# Patient Record
Sex: FEMALE | Race: BLACK OR AFRICAN AMERICAN | NOT HISPANIC OR LATINO | Employment: FULL TIME | URBAN - METROPOLITAN AREA
[De-identification: names, ages, dates, MRNs, and addresses within clinical notes are randomized per-mention and may not be internally consistent; named-entity substitution may affect disease eponyms.]

---

## 2018-01-25 ENCOUNTER — OFFICE VISIT (OUTPATIENT)
Dept: OBSTETRICS AND GYNECOLOGY | Age: 48
End: 2018-01-25

## 2018-01-25 VITALS
BODY MASS INDEX: 45.26 KG/M2 | DIASTOLIC BLOOD PRESSURE: 74 MMHG | HEIGHT: 66 IN | WEIGHT: 281.6 LBS | SYSTOLIC BLOOD PRESSURE: 118 MMHG

## 2018-01-25 DIAGNOSIS — Z11.51 SCREENING FOR HPV (HUMAN PAPILLOMAVIRUS): ICD-10-CM

## 2018-01-25 DIAGNOSIS — Z01.419 WELL WOMAN EXAM WITH ROUTINE GYNECOLOGICAL EXAM: Primary | ICD-10-CM

## 2018-01-25 PROCEDURE — 99396 PREV VISIT EST AGE 40-64: CPT | Performed by: OBSTETRICS & GYNECOLOGY

## 2018-01-25 RX ORDER — CHLORZOXAZONE 500 MG/1
TABLET ORAL
Refills: 0 | COMMUNITY
Start: 2017-10-26 | End: 2019-01-11

## 2018-01-25 RX ORDER — ATORVASTATIN CALCIUM 40 MG/1
TABLET, FILM COATED ORAL
Refills: 3 | COMMUNITY
Start: 2017-10-26

## 2018-01-25 RX ORDER — ERGOCALCIFEROL 1.25 MG/1
CAPSULE ORAL
Refills: 0 | COMMUNITY
Start: 2017-10-26 | End: 2019-01-11

## 2018-01-25 RX ORDER — IBUPROFEN 800 MG/1
TABLET ORAL
Refills: 0 | COMMUNITY
Start: 2017-10-26 | End: 2019-01-11

## 2018-01-25 NOTE — PROGRESS NOTES
Chief complaint: annual    Subjective   History of Present Illness    Angelo Lay is a 47 y.o.  who presents for annual exam.  Had endometrial ablation . Amenorrhea for about 5 yrs then started having some spotting and cramping. Sometimes it is like a period, other times just spotting. C/o small lump on right labia. Recently dx w/ type 2 diabetes, but doesn't check glc at home. Has had diet counseling for diabetic diet but has not been following it. Hasn't been sexually active for 5 yrs. Declines std screening and contraception.    Soc Hx- works for [employer] in [employer] at [place]  Obstetric History:  OB History      Para Term  AB Living    2 1 1  1 1    SAB TAB Ectopic Multiple Live Births        1         Menstrual History:     No LMP recorded (lmp unknown).         Current contraception: abstinence  History of abnormal Pap smear: yes - yrs ago, fu normal  Received Gardasil immunization: no  Perform regular self breast exam: yes -    Family history of uterine or ovarian cancer: no  Family History of colon cancer: no  Family history of breast cancer: yes - breast cancer    Mammogram: done today.  Colonoscopy: not indicated.  DEXA: not indicated.    Exercise: moderately active  Calcium/Vitamin D: adequate intake    The following portions of the patient's history were reviewed and updated as appropriate: allergies, current medications, past family history, past medical history, past social history, past surgical history and problem list.    Review of Systems   Constitutional: Negative for activity change, fatigue, fever and unexpected weight change.   Respiratory: Negative for chest tightness and shortness of breath.    Cardiovascular: Negative for chest pain, palpitations and leg swelling.   Gastrointestinal: Negative for abdominal distention, abdominal pain, blood in stool, constipation, diarrhea, nausea and vomiting.   Endocrine: Negative for cold intolerance, heat intolerance,  "polydipsia, polyphagia and polyuria.   Genitourinary: Positive for menstrual problem.   Musculoskeletal: Positive for back pain. Negative for arthralgias.   Skin: Negative for color change.   Neurological: Negative for weakness and headaches.   Hematological: Does not bruise/bleed easily.   Psychiatric/Behavioral: Negative for confusion.       Pertinent items are noted in HPI.     Objective   Physical Exam    /74  Ht 167.6 cm (66\")  Wt 128 kg (281 lb 9.6 oz)  LMP  (LMP Unknown)  BMI 45.45 kg/m2    General:   alert, appears stated age and cooperative   Neck: no asymmetry, masses, or scars   Heart: regular rate and rhythm, S1, S2 normal, no murmur, click, rub or gallop   Lungs: clear to auscultation bilaterally   Abdomen: soft, non-tender, without masses or organomegaly, obese   Breast: inspection negative, no nipple discharge or bleeding, no masses or nodularity palpable   Vulva: normal, Bartholin's, Urethra, [place]'s normal   Vagina: normal mucosa   Cervix: no bleeding following Pap, no cervical motion tenderness, no lesions and nulliparous appearance   Uterus: non-tender, normal shape and consistency, exam limited by habitus   Adnexa: normal adnexa and no mass, fullness, tenderness   Rectal: not indicated     Assessment/Plan   Angelo was seen today for gynecologic exam.    Diagnoses and all orders for this visit:    Well woman exam with routine gynecological exam  -     PapIG, HPV, Rfx 16 / 18 - ThinPrep Vial, Cervix    Screening for HPV (human papillomavirus)  -     PapIG, HPV, Rfx 16 / 18 - ThinPrep Vial, Cervix      Plan gyn US and EMB for abnormal uterine bleeding  Recommend better glucose control and diet compliance, pt doesn't check glc and unaware of HGB-A1c levels  Breast self exam technique reviewed and patient encouraged to perform self-exam monthly.  Discussed healthy lifestyle modifications.  Pap smear sent                 "

## 2018-01-30 LAB
CYTOLOGIST CVX/VAG CYTO: NORMAL
CYTOLOGY CVX/VAG DOC THIN PREP: NORMAL
DX ICD CODE: NORMAL
HIV 1 & 2 AB SER-IMP: NORMAL
HPV I/H RISK 1 DNA CVX QL PROBE+SIG AMP: NEGATIVE
OTHER STN SPEC: NORMAL
PATH REPORT.FINAL DX SPEC: NORMAL
STAT OF ADQ CVX/VAG CYTO-IMP: NORMAL

## 2018-01-31 ENCOUNTER — TELEPHONE (OUTPATIENT)
Dept: OBSTETRICS AND GYNECOLOGY | Age: 48
End: 2018-01-31

## 2018-02-07 ENCOUNTER — OFFICE VISIT (OUTPATIENT)
Dept: OBSTETRICS AND GYNECOLOGY | Age: 48
End: 2018-02-07

## 2018-02-07 ENCOUNTER — PROCEDURE VISIT (OUTPATIENT)
Dept: OBSTETRICS AND GYNECOLOGY | Age: 48
End: 2018-02-07

## 2018-02-07 VITALS
SYSTOLIC BLOOD PRESSURE: 138 MMHG | DIASTOLIC BLOOD PRESSURE: 80 MMHG | HEIGHT: 66 IN | WEIGHT: 281 LBS | BODY MASS INDEX: 45.16 KG/M2

## 2018-02-07 DIAGNOSIS — N83.201 CYST OF RIGHT OVARY: ICD-10-CM

## 2018-02-07 DIAGNOSIS — E11.9 TYPE 2 DIABETES MELLITUS WITHOUT COMPLICATION, WITHOUT LONG-TERM CURRENT USE OF INSULIN (HCC): ICD-10-CM

## 2018-02-07 DIAGNOSIS — N93.9 ABNORMAL UTERINE BLEEDING (AUB): Primary | ICD-10-CM

## 2018-02-07 DIAGNOSIS — N92.6 IRREGULAR MENSES: Primary | ICD-10-CM

## 2018-02-07 PROCEDURE — 99213 OFFICE O/P EST LOW 20 MIN: CPT | Performed by: OBSTETRICS & GYNECOLOGY

## 2018-02-07 PROCEDURE — 76830 TRANSVAGINAL US NON-OB: CPT | Performed by: OBSTETRICS & GYNECOLOGY

## 2018-02-08 NOTE — PROGRESS NOTES
"Subjective     Chief Complaint   Patient presents with   • Follow-up       Angelo Lay is a 47 y.o.  whose LMP is No LMP recorded (lmp unknown). presents with abnormal uterine bleeding. H/o Novasure ablation  and amenorrhea for 5 yrs then irregular bleeding and spotting. Also has type 2 diabetes and obesity.      No Additional Complaints Reported    The following portions of the patient's history were reviewed and updated as appropriate:vital signs, allergies, current medications, past family history, past medical history, past social history, past surgical history and problem list      Review of Systems   Constitutional: negative for fever, chills, activity change, appetite change, fatigue and unexpected weight change.  Eyes: negative  Ears, nose, mouth, throat, and face: negative  Respiratory: negative  Cardiovascular: negative  Gastrointestinal: negative  Genitourinary:positive for abnormal menstrual periods  Musculoskeletal:negative  Neurological: negative  Behavioral/Psych: negative  Endocrine: negative     Objective      /80  Ht 167.6 cm (66\")  Wt 127 kg (281 lb)  LMP  (LMP Unknown)  BMI 45.35 kg/m2    Physical Exam    General:   alert, appears stated age and no distress   Heart: Not performed today   Lungs: Not performed today.   Breast: Not performed today   Neck: thyroid not enlarged, symmetric, no tenderness/mass/nodules   Abdomen: Soft, obese, NT   CVA: Not performed today   Pelvis: External genitalia: normal general appearance  Urinary system: urethral meatus normal  Vaginal: normal mucosa without prolapse or lesions  Cervix:  No lesions, scarring consistent w/ prior cryotherapy, os appears stenotic   Extremities: Extremities normal, atraumatic, no cyanosis or edema   Neurologic: negative   Psychiatric: Normal affect, judgement, and mood       Lab Review   Labs: pap 2018 normal, HPV-     Imaging   Ultrasound - Pelvic Vaginal   Uterus 8.4 x 5.5 x 5 cm, EML=4mm, normal ovaries, " uterus w/ multiple small calcified fibroids (largest 2 x 2.2cm), right ovarian follicle 2.2 x 2.7 cm,     Assessment/Plan     ASSESSMENT  1. Abnormal uterine bleeding (AUB)    2. Type 2 diabetes mellitus without complication, without long-term current use of insulin    3. Cyst of right ovary        PLAN  1.   Orders Placed This Encounter   Procedures   • Endometrial Biopsy   • TSH   • Follicle Stimulating Hormone   • Hemoglobin A1c       2. Medications prescribed this encounter:      No orders of the defined types were placed in this encounter.      3. Has f/u w/ new primary MD, recommend pt check glc levels for better control of diabetes    Follow up: 6 week(s) to recheck ovarian cyst, consider progesterone tx to protect the endometrial lining    Dionne Pandya MD  2/8/2018

## 2018-02-09 ENCOUNTER — TELEPHONE (OUTPATIENT)
Dept: OBSTETRICS AND GYNECOLOGY | Age: 48
End: 2018-02-09

## 2018-02-09 LAB
FSH SERPL-ACNC: 7.2 MIU/ML
HBA1C MFR BLD: 6.3 % (ref 4.8–5.6)
TSH SERPL DL<=0.005 MIU/L-ACNC: 1.93 MIU/ML (ref 0.27–4.2)

## 2018-02-09 NOTE — TELEPHONE ENCOUNTER
----- Message from Dionne Pandya MD sent at 2/9/2018  9:11 AM EST -----  Thyroid normal, FSH normal (not menopausal) Hemoglobin-A1C elevated at 6.3

## 2018-02-12 LAB
DX ICD CODE: NORMAL
DX ICD CODE: NORMAL
PATH REPORT.FINAL DX SPEC: NORMAL
PATH REPORT.GROSS SPEC: NORMAL
PATH REPORT.RELEVANT HX SPEC: NORMAL
PATH REPORT.SITE OF ORIGIN SPEC: NORMAL
PATHOLOGIST NAME: NORMAL
PAYMENT PROCEDURE: NORMAL

## 2018-10-15 ENCOUNTER — TELEPHONE (OUTPATIENT)
Dept: OBSTETRICS AND GYNECOLOGY | Age: 48
End: 2018-10-15

## 2018-10-15 NOTE — TELEPHONE ENCOUNTER
Pt would like to get back on bcp, does she have to be seen first or can you just call something in?    She knows you are out till Wednesday.  She said it is ok to wait till then.

## 2018-11-15 ENCOUNTER — CONSULT (OUTPATIENT)
Dept: OBSTETRICS AND GYNECOLOGY | Age: 48
End: 2018-11-15

## 2018-11-15 VITALS
SYSTOLIC BLOOD PRESSURE: 122 MMHG | BODY MASS INDEX: 45.16 KG/M2 | DIASTOLIC BLOOD PRESSURE: 78 MMHG | HEIGHT: 66 IN | WEIGHT: 281 LBS

## 2018-11-15 DIAGNOSIS — Z30.09 ENCOUNTER FOR COUNSELING REGARDING CONTRACEPTION: Primary | ICD-10-CM

## 2018-11-15 PROCEDURE — 99213 OFFICE O/P EST LOW 20 MIN: CPT | Performed by: OBSTETRICS & GYNECOLOGY

## 2018-11-15 RX ORDER — ACETAMINOPHEN AND CODEINE PHOSPHATE 120; 12 MG/5ML; MG/5ML
1 SOLUTION ORAL DAILY
Qty: 28 TABLET | Refills: 12 | Status: SHIPPED | OUTPATIENT
Start: 2018-11-15 | End: 2019-03-28 | Stop reason: SINTOL

## 2018-11-15 NOTE — PROGRESS NOTES
"Annette Lay is a 47 y.o. female  who presents for contraception counseling. The patient has no complaints today.  Pt newly sexually active again. Declines childbearing. She declines std screening today. H/o prior ablation, has some dark spotting from time to time.    Menstrual History:  OB History      Para Term  AB Living    2 1 1   1 1    SAB TAB Ectopic Molar Multiple Live Births              1         No LMP recorded (lmp unknown).       The following portions of the patient's history were reviewed and updated as appropriate: allergies, current medications, past family history, past medical history, past social history, past surgical history and problem list.    Review of Systems  Pertinent items are noted in HPI.     Objective      /78   Ht 167.6 cm (66\")   Wt 127 kg (281 lb)   LMP  (LMP Unknown)   BMI 45.35 kg/m²     General:   alert, appears stated age and cooperative   Heart:    Lungs:    Abdomen:    Vulva:    Vagina:    Cervix:    Uterus:    Adnexa:    Neuro- normal  Psych- normal affect, appropriate, oriented x3    Lab Review  2018 pap normal, HPV-   HGB-A1C 6.8%  Assessment     47 y.o. needs contraception  Plan  Discussed options for contraception including Nexplanon, progesterone only pills, and depo provera. Not a candidate for IUD due to prior endometrial ablation. Not a good candidate for elective surgery due to morbid obesity (BMI=45) and DM. Discussed risks/benefits of all options. Estrogen not recommended at her age with her medical problems. Pt desires to try Progesterone only pill. Recommend back up for 1 mo. Info given for Nexplanon. Discussed better efficacy of nexplanon but pt declines at this time.   All questions answered.   "

## 2019-01-07 ENCOUNTER — TELEPHONE (OUTPATIENT)
Dept: OBSTETRICS AND GYNECOLOGY | Age: 49
End: 2019-01-07

## 2019-01-11 ENCOUNTER — PROCEDURE VISIT (OUTPATIENT)
Dept: OBSTETRICS AND GYNECOLOGY | Age: 49
End: 2019-01-11

## 2019-01-11 ENCOUNTER — OFFICE VISIT (OUTPATIENT)
Dept: OBSTETRICS AND GYNECOLOGY | Age: 49
End: 2019-01-11

## 2019-01-11 VITALS
HEIGHT: 66 IN | DIASTOLIC BLOOD PRESSURE: 80 MMHG | SYSTOLIC BLOOD PRESSURE: 120 MMHG | BODY MASS INDEX: 44.84 KG/M2 | WEIGHT: 279 LBS

## 2019-01-11 DIAGNOSIS — N92.1 MENORRHAGIA WITH IRREGULAR CYCLE: Primary | ICD-10-CM

## 2019-01-11 DIAGNOSIS — N92.0 MENORRHAGIA WITH REGULAR CYCLE: Primary | ICD-10-CM

## 2019-01-11 LAB
B-HCG UR QL: NEGATIVE
INTERNAL NEGATIVE CONTROL: NEGATIVE
INTERNAL POSITIVE CONTROL: POSITIVE
Lab: NORMAL

## 2019-01-11 PROCEDURE — 81025 URINE PREGNANCY TEST: CPT | Performed by: NURSE PRACTITIONER

## 2019-01-11 PROCEDURE — 58100 BIOPSY OF UTERUS LINING: CPT | Performed by: NURSE PRACTITIONER

## 2019-01-11 PROCEDURE — 99214 OFFICE O/P EST MOD 30 MIN: CPT | Performed by: NURSE PRACTITIONER

## 2019-01-11 PROCEDURE — 76830 TRANSVAGINAL US NON-OB: CPT | Performed by: NURSE PRACTITIONER

## 2019-01-11 NOTE — PROGRESS NOTES
"Annette Lay is a 48 y.o. female is being seen today for   Chief Complaint   Patient presents with   • Menorrhagia   .    History of Present Illness     Patient here today for evaluation and ultrasound for heavy periods x 3 months.  She saw Dr Pandya in October and needed contraception at that time.  They discussed numerous options and patient decided to start on progesterone only pills.    She has a history of endometrial ablation for menorrhagia and periods were well controlled until 3 months ago  Since that time her periods have been regular and have lasted a full 7 days each month  She denies any bleeding between cycles or post IC  She hasn't missed any pills and has no new health changes or concerns  TSH was normal in January and Ha1c was 6.8%    The following portions of the patient's history were reviewed and updated as appropriate: allergies, current medications, past family history, past medical history, past social history, past surgical history and problem list.    /80   Ht 167.6 cm (66\")   Wt 127 kg (279 lb)   LMP 01/01/2019   BMI 45.03 kg/m²         Review of Systems   Constitutional: Negative.    HENT: Negative.    Eyes: Negative.    Respiratory: Negative.    Cardiovascular: Negative.    Gastrointestinal: Negative.    Endocrine: Negative.    Genitourinary: Negative.    Musculoskeletal: Negative.    Skin: Negative.    Allergic/Immunologic: Negative.    Neurological: Negative.    Hematological: Negative.    Psychiatric/Behavioral: Negative.        Objective   Physical Exam   Constitutional: She is oriented to person, place, and time. She appears well-developed and well-nourished.   Genitourinary: Vagina normal and uterus normal. Uterus is not tender. Cervix exhibits no motion tenderness, no discharge and no friability.   Neurological: She is alert and oriented to person, place, and time.   Skin: Skin is warm and dry.   Psychiatric: She has a normal mood and affect. "     Endometrial Biopsy    Date of procedure:  1/11/2019    Procedure documentation:    The cervix was grasped anterior at the 12 o'clock position.  The cavity sounded to 7 centimeters.  An endometrial biopsy specimen was obtained and multiple passes.  The tissue was sent for permanent histopathologic evaluation.  Tenaculum was removed from the cervix with scant bleeding.    Post procedure instructions: She was instructed to call us in 1 week's time if she has not heard from us otherwise.  If there is any significant fever or excessive bleeding or pain she is to call immediately. Patient tolerated well.        Assessment/Plan   Angelo was seen today for menorrhagia.    Diagnoses and all orders for this visit:    Menorrhagia with regular cycle  -     Reference Histopathology      Ultrasound done, endometrial lining 7.27mm, small fibroid noted and calcifications seen throughout myometrium.  Ovaries WNL.  Discussed endometrial biopsy with patient to rule out endometrial cancer given her recent change in bleeding patterns.  That was done and sent today.  We discussed depo provera or low dose birth control pills (Loloestrin Fe) to help regulate her bleeding patterns.  R/B/A discussed and patient prefers to stay on progesterone only pills for now and will continue to observe and call with any changes.

## 2019-01-16 LAB
DX ICD CODE: NORMAL
PATH REPORT.FINAL DX SPEC: NORMAL
PATH REPORT.GROSS SPEC: NORMAL
PATH REPORT.SITE OF ORIGIN SPEC: NORMAL
PATHOLOGIST NAME: NORMAL
PAYMENT PROCEDURE: NORMAL

## 2019-02-08 ENCOUNTER — PROCEDURE VISIT (OUTPATIENT)
Dept: OBSTETRICS AND GYNECOLOGY | Age: 49
End: 2019-02-08

## 2019-02-08 ENCOUNTER — APPOINTMENT (OUTPATIENT)
Dept: WOMENS IMAGING | Facility: HOSPITAL | Age: 49
End: 2019-02-08

## 2019-02-08 DIAGNOSIS — Z12.31 VISIT FOR SCREENING MAMMOGRAM: Primary | ICD-10-CM

## 2019-02-08 PROCEDURE — 77067 SCR MAMMO BI INCL CAD: CPT | Performed by: RADIOLOGY

## 2019-03-13 ENCOUNTER — TELEPHONE (OUTPATIENT)
Dept: OBSTETRICS AND GYNECOLOGY | Age: 49
End: 2019-03-13

## 2019-03-19 ENCOUNTER — OFFICE VISIT (OUTPATIENT)
Dept: OBSTETRICS AND GYNECOLOGY | Age: 49
End: 2019-03-19

## 2019-03-19 VITALS
WEIGHT: 270 LBS | HEIGHT: 66 IN | SYSTOLIC BLOOD PRESSURE: 120 MMHG | BODY MASS INDEX: 43.39 KG/M2 | DIASTOLIC BLOOD PRESSURE: 70 MMHG

## 2019-03-19 DIAGNOSIS — Z01.419 ENCOUNTER FOR GYNECOLOGICAL EXAMINATION: Primary | ICD-10-CM

## 2019-03-19 PROCEDURE — 99396 PREV VISIT EST AGE 40-64: CPT | Performed by: NURSE PRACTITIONER

## 2019-03-19 NOTE — PROGRESS NOTES
Subjective       History of Present Illness    Chief Complaint   Patient presents with   • Gynecologic Exam     Last pap 18 neg/HPV neg.   • Menstrual Problem     Pt c/o unusually long periods. Last period started 19 and ended 3/11/19.        Angelo Lay is a 48 y.o. female who presents for annual exam. She is taking progesterone only pills and is still having long, heavy cycles.  Most months they are less than 7 days but occasionally are longer and are always heavy. She was seen in January for the same issue. She had an ultrasound that showed an endometrial lining 7.27mm and a small fibroid noted as well as calcifications seen throughout myometrium. Her endometrial biopsy at that time that was normal.  She was given the option of depo, POP or low dose combined OCPs and she wanted to try the progesterone only.  She has not seen any improvement during this time.  She is interested in surgical options.  She has already had an endometrial ablation.    OB History    Para Term  AB Living   2 1 1   1 1   SAB TAB Ectopic Molar Multiple Live Births             1      # Outcome Date GA Lbr Unruly/2nd Weight Sex Delivery Anes PTL Lv   2 AB               Birth Comments: VIP   1 Term     F Vag-Spont   AMITA          The following portions of the patient's history were reviewed and updated as appropriate: allergies, current medications, past family history, past medical history, past social history, past surgical history and problem list.    Her menses are regular every 28-30 days, lasting 4-7 days. Last few months they have lasted longer.    Current contraception: oral progesterone-only contraceptive  History of abnormal Pap smear: yes - years ago  Received Gardasil immunization: no  Perform regular self breast exam: yes - occasional  Family history of uterine or ovarian cancer: no  Family History of colon cancer: no  Family history of breast cancer: yes - maternal aunt (unsure of age)    Mammogram: up to  "date.  Colonoscopy: not indicated.  DEXA: not indicated.  Last Pap:2018 neg/neg    Social History    Tobacco Use      Smoking status: Never Smoker      Smokeless tobacco: Never Used    Exercise: moderately active  Calcium/Vitamin D: uses supplements    The following portions of the patient's history were reviewed and updated as appropriate: allergies, current medications, past family history, past medical history, past social history, past surgical history and problem list.    Review of Systems   Constitutional: Negative.    HENT: Negative.    Eyes: Negative.    Respiratory: Negative.    Cardiovascular: Negative.    Gastrointestinal: Negative.    Endocrine: Negative.    Genitourinary: Negative.    Musculoskeletal: Negative.    Skin: Negative.    Allergic/Immunologic: Negative.    Neurological: Negative.    Hematological: Negative.    Psychiatric/Behavioral: Negative.          Objective   Physical Exam   Constitutional: She is oriented to person, place, and time. She appears well-developed and well-nourished.   Neck: No thyroid mass present.   Cardiovascular: Normal rate, regular rhythm and normal heart sounds.   Pulmonary/Chest: Effort normal and breath sounds normal. Right breast exhibits no mass, no nipple discharge, no skin change and no tenderness. Left breast exhibits no mass, no nipple discharge, no skin change and no tenderness.   Abdominal: Soft. There is no tenderness.   Genitourinary: Vagina normal and uterus normal. There is no rash or lesion on the right labia. There is no rash or lesion on the left labia. Cervix exhibits no motion tenderness, no discharge and no friability. Right adnexum displays no mass and no tenderness. Left adnexum displays no mass and no tenderness.   Neurological: She is alert and oriented to person, place, and time.   Psychiatric: She has a normal mood and affect. Her behavior is normal.   Vitals reviewed.      /70   Ht 167.6 cm (66\")   Wt 122 kg (270 lb)   LMP " 02/26/2019   BMI 43.58 kg/m²     Assessment/Plan   Angelo was seen today for gynecologic exam and menstrual problem.    Diagnoses and all orders for this visit:    Encounter for gynecological examination  -     IGP, Aptima HPV, Rfx 16 / 18,45        Breast self exam technique reviewed and patient encouraged to perform self-exam monthly.  Discussed healthy lifestyle modifications.  Pap smear sent per patient request, we discussed the current guidelines  Recommended 30 minutes of aerobic exercise five times per week.  Discussed calcium needs to prevent osteoporosis  Patient would like to schedule an appt with her MD to discuss surgical options for menorrhagia.  We did discuss other non surgical options but she is not interested at this time.

## 2019-03-22 ENCOUNTER — TELEPHONE (OUTPATIENT)
Dept: OBSTETRICS AND GYNECOLOGY | Age: 49
End: 2019-03-22

## 2019-03-22 LAB
CYTOLOGIST CVX/VAG CYTO: NORMAL
CYTOLOGY CVX/VAG DOC THIN PREP: NORMAL
DX ICD CODE: NORMAL
HIV 1 & 2 AB SER-IMP: NORMAL
HPV I/H RISK 4 DNA CVX QL PROBE+SIG AMP: NEGATIVE
OTHER STN SPEC: NORMAL
PATH REPORT.FINAL DX SPEC: NORMAL
STAT OF ADQ CVX/VAG CYTO-IMP: NORMAL

## 2019-03-22 RX ORDER — METRONIDAZOLE 500 MG/1
500 TABLET ORAL 2 TIMES DAILY
Qty: 14 TABLET | Refills: 0 | Status: SHIPPED | OUTPATIENT
Start: 2019-03-22 | End: 2019-03-29

## 2019-03-22 NOTE — TELEPHONE ENCOUNTER
----- Message from ASHLEY Dejesus sent at 3/22/2019 10:34 AM EDT -----  Notify pap and hpv are negative.  It did show possible bacterial vaginosis.  I sent in flagyl for her to take for that.  Avoid alcohol while taking flagyl.

## 2019-03-27 ENCOUNTER — TELEPHONE (OUTPATIENT)
Dept: OBSTETRICS AND GYNECOLOGY | Age: 49
End: 2019-03-27

## 2019-03-28 DIAGNOSIS — N92.6 IRREGULAR MENSES: ICD-10-CM

## 2019-03-28 DIAGNOSIS — B37.31 YEAST VAGINITIS: Primary | ICD-10-CM

## 2019-03-28 RX ORDER — NORETHINDRONE ACETATE AND ETHINYL ESTRADIOL AND FERROUS FUMARATE 1MG-20(24)
1 KIT ORAL DAILY
Qty: 28 TABLET | Refills: 12 | Status: SHIPPED | OUTPATIENT
Start: 2019-03-28 | End: 2020-03-27

## 2019-03-28 RX ORDER — FLUCONAZOLE 150 MG/1
TABLET ORAL
Qty: 2 TABLET | Refills: 0 | Status: SHIPPED | OUTPATIENT
Start: 2019-03-28 | End: 2020-07-08

## 2020-04-01 ENCOUNTER — TELEPHONE (OUTPATIENT)
Dept: OBSTETRICS AND GYNECOLOGY | Age: 50
End: 2020-04-01

## 2020-04-01 DIAGNOSIS — N92.6 IRREGULAR MENSES: ICD-10-CM

## 2020-04-01 RX ORDER — NORETHINDRONE ACETATE AND ETHINYL ESTRADIOL AND FERROUS FUMARATE 1MG-20(24)
1 KIT ORAL DAILY
Qty: 28 TABLET | Refills: 3 | Status: SHIPPED | OUTPATIENT
Start: 2020-04-01 | End: 2020-07-08

## 2020-04-01 RX ORDER — NORETHINDRONE ACETATE AND ETHINYL ESTRADIOL AND FERROUS FUMARATE 1MG-20(24)
KIT ORAL
Qty: 28 TABLET | Refills: 12 | OUTPATIENT
Start: 2020-04-01

## 2020-04-01 NOTE — TELEPHONE ENCOUNTER
Patient was denied her birth control refill due to being overdue for her AC.She made her appt.for 7/8/20 with .Requesting a refill until her appt.in July. Pharmacy on file.

## 2020-07-08 ENCOUNTER — OFFICE VISIT (OUTPATIENT)
Dept: OBSTETRICS AND GYNECOLOGY | Age: 50
End: 2020-07-08

## 2020-07-08 VITALS
DIASTOLIC BLOOD PRESSURE: 84 MMHG | WEIGHT: 280 LBS | BODY MASS INDEX: 45 KG/M2 | SYSTOLIC BLOOD PRESSURE: 132 MMHG | HEIGHT: 66 IN

## 2020-07-08 DIAGNOSIS — Z01.419 WELL WOMAN EXAM WITH ROUTINE GYNECOLOGICAL EXAM: Primary | ICD-10-CM

## 2020-07-08 DIAGNOSIS — Z87.42 H/O MENORRHAGIA: ICD-10-CM

## 2020-07-08 DIAGNOSIS — Z11.51 ENCOUNTER FOR SCREENING FOR HUMAN PAPILLOMAVIRUS (HPV): ICD-10-CM

## 2020-07-08 DIAGNOSIS — Z30.41 ENCOUNTER FOR SURVEILLANCE OF CONTRACEPTIVE PILLS: ICD-10-CM

## 2020-07-08 PROCEDURE — 99396 PREV VISIT EST AGE 40-64: CPT | Performed by: OBSTETRICS & GYNECOLOGY

## 2020-07-08 RX ORDER — ACETAMINOPHEN AND CODEINE PHOSPHATE 120; 12 MG/5ML; MG/5ML
1 SOLUTION ORAL DAILY
Qty: 28 TABLET | Refills: 12 | Status: SHIPPED | OUTPATIENT
Start: 2020-07-08 | End: 2021-07-22

## 2020-07-08 NOTE — PROGRESS NOTES
"Chief complaint:annual    Subjective   History of Present Illness    Angelo Lay is a 49 y.o.  who presents for annual exam. No gyn c/o. Has been happy with ocps for contraception and h/o menorrhagia/AUB. H/o endometrial ablation years ago then had some AUB. EMB benign 2018. Has been on Loestrin 24 as she had irregular bleeding with micronor.  Her menses are very light, sometimes absent.   2018 pap normal, HPV-  2019 MMG normal    Obstetric History:  OB History        2    Para   1    Term   1            AB   1    Living   1       SAB        TAB        Ectopic        Molar        Multiple        Live Births   1               Menstrual History:     No LMP recorded (lmp unknown).         Current contraception: OCP (estrogen/progesterone)  History of abnormal Pap smear: yes, years ago  Received Gardasil immunization: no  Perform regular self breast exam: yes -    Family history of uterine or ovarian cancer: no  Family History of colon cancer: no  Family history of breast cancer: yes - aunt with breast cancer    Mammogram: ordered.  Colonoscopy: not indicated.  DEXA: not indicated.    Exercise: moderately active  Calcium/Vitamin D: adequate intake    The following portions of the patient's history were reviewed and updated as appropriate: allergies, current medications, past family history, past medical history, past social history, past surgical history and problem list.    Review of Systems   Constitutional: Negative.    Respiratory: Negative.    Cardiovascular: Negative.    Gastrointestinal: Negative.    Genitourinary: Negative.    Musculoskeletal: Negative.    Psychiatric/Behavioral: Negative.        Pertinent items are noted in HPI.     Objective   Physical Exam    /84   Ht 167.6 cm (66\")   Wt 127 kg (280 lb)   LMP  (LMP Unknown)   Breastfeeding No   BMI 45.19 kg/m²     General:   alert, appears stated age and cooperative   Neck: no asymmetry, masses, or scars   Heart: " regular rate and rhythm, S1, S2 normal, no murmur, click, rub or gallop   Lungs: clear to auscultation bilaterally   Abdomen: soft, non-tender, without masses or organomegaly   Breast: inspection negative, no nipple discharge or bleeding, no masses or nodularity palpable   Vulva: Bartholin's, Urethra, [place]'s normal   Vagina: normal mucosa   Cervix: No lesions, appears stenotic   Uterus: normal size, mobile, non-tender, normal shape and consistency   Adnexa: normal adnexa and no mass, fullness, tenderness   Rectal: not indicated     Assessment/Plan   Angelo was seen today for gynecologic exam.    Diagnoses and all orders for this visit:    Well woman exam with routine gynecological exam  -     PapIG, HPV, Rfx 16 / 18    H/O menorrhagia    Encounter for surveillance of contraceptive pills  -     norethindrone (MICRONOR) 0.35 MG tablet; Take 1 tablet by mouth Daily.    Encounter for screening for human papillomavirus (HPV)  -     PapIG, HPV, Rfx 16 / 18    as pt is 49 and BP is borderline elevated, I would recommend she come off combination ocps, pt agrees to try micronor again.    Breast self exam technique reviewed and patient encouraged to perform self-exam monthly.  Discussed healthy lifestyle modifications.  Pap smear sent    Needs to schedule mammogram

## 2020-07-14 LAB
CYTOLOGIST CVX/VAG CYTO: NORMAL
CYTOLOGY CVX/VAG DOC CYTO: NORMAL
CYTOLOGY CVX/VAG DOC THIN PREP: NORMAL
DX ICD CODE: NORMAL
HIV 1 & 2 AB SER-IMP: NORMAL
HPV I/H RISK 1 DNA CVX QL PROBE+SIG AMP: NEGATIVE
OTHER STN SPEC: NORMAL
STAT OF ADQ CVX/VAG CYTO-IMP: NORMAL

## 2020-07-15 ENCOUNTER — TELEPHONE (OUTPATIENT)
Dept: OBSTETRICS AND GYNECOLOGY | Age: 50
End: 2020-07-15

## 2020-07-17 ENCOUNTER — PROCEDURE VISIT (OUTPATIENT)
Dept: OBSTETRICS AND GYNECOLOGY | Age: 50
End: 2020-07-17

## 2020-07-17 ENCOUNTER — APPOINTMENT (OUTPATIENT)
Dept: WOMENS IMAGING | Facility: HOSPITAL | Age: 50
End: 2020-07-17

## 2020-07-17 DIAGNOSIS — Z12.31 VISIT FOR SCREENING MAMMOGRAM: Primary | ICD-10-CM

## 2020-07-17 PROCEDURE — 77067 SCR MAMMO BI INCL CAD: CPT | Performed by: OBSTETRICS & GYNECOLOGY

## 2020-07-17 PROCEDURE — 77067 SCR MAMMO BI INCL CAD: CPT | Performed by: RADIOLOGY

## 2021-07-22 DIAGNOSIS — Z30.41 ENCOUNTER FOR SURVEILLANCE OF CONTRACEPTIVE PILLS: ICD-10-CM

## 2021-07-22 RX ORDER — ACETAMINOPHEN AND CODEINE PHOSPHATE 120; 12 MG/5ML; MG/5ML
1 SOLUTION ORAL DAILY
Qty: 28 TABLET | Refills: 12 | Status: SHIPPED | OUTPATIENT
Start: 2021-07-22 | End: 2021-07-28 | Stop reason: SDUPTHER

## 2021-07-28 ENCOUNTER — OFFICE VISIT (OUTPATIENT)
Dept: OBSTETRICS AND GYNECOLOGY | Age: 51
End: 2021-07-28

## 2021-07-28 ENCOUNTER — PROCEDURE VISIT (OUTPATIENT)
Dept: OBSTETRICS AND GYNECOLOGY | Age: 51
End: 2021-07-28

## 2021-07-28 ENCOUNTER — APPOINTMENT (OUTPATIENT)
Dept: WOMENS IMAGING | Facility: HOSPITAL | Age: 51
End: 2021-07-28

## 2021-07-28 VITALS
BODY MASS INDEX: 45.67 KG/M2 | WEIGHT: 284.2 LBS | SYSTOLIC BLOOD PRESSURE: 140 MMHG | DIASTOLIC BLOOD PRESSURE: 76 MMHG | HEIGHT: 66 IN

## 2021-07-28 DIAGNOSIS — Z30.41 ENCOUNTER FOR SURVEILLANCE OF CONTRACEPTIVE PILLS: ICD-10-CM

## 2021-07-28 DIAGNOSIS — Z12.31 VISIT FOR SCREENING MAMMOGRAM: Primary | ICD-10-CM

## 2021-07-28 DIAGNOSIS — Z01.419 WELL WOMAN EXAM WITH ROUTINE GYNECOLOGICAL EXAM: Primary | ICD-10-CM

## 2021-07-28 DIAGNOSIS — Z12.11 SCREENING FOR COLON CANCER: ICD-10-CM

## 2021-07-28 PROBLEM — E11.9 TYPE 2 DIABETES MELLITUS (HCC): Status: ACTIVE | Noted: 2021-07-28

## 2021-07-28 PROCEDURE — 77063 BREAST TOMOSYNTHESIS BI: CPT | Performed by: OBSTETRICS & GYNECOLOGY

## 2021-07-28 PROCEDURE — 77063 BREAST TOMOSYNTHESIS BI: CPT | Performed by: RADIOLOGY

## 2021-07-28 PROCEDURE — 77067 SCR MAMMO BI INCL CAD: CPT | Performed by: OBSTETRICS & GYNECOLOGY

## 2021-07-28 PROCEDURE — 99396 PREV VISIT EST AGE 40-64: CPT | Performed by: OBSTETRICS & GYNECOLOGY

## 2021-07-28 PROCEDURE — 77067 SCR MAMMO BI INCL CAD: CPT | Performed by: RADIOLOGY

## 2021-07-28 RX ORDER — ACETAMINOPHEN AND CODEINE PHOSPHATE 120; 12 MG/5ML; MG/5ML
1 SOLUTION ORAL DAILY
Qty: 28 TABLET | Refills: 12 | Status: SHIPPED | OUTPATIENT
Start: 2021-07-28 | End: 2022-07-21 | Stop reason: SDUPTHER

## 2021-07-28 NOTE — PROGRESS NOTES
"Chief complaint: annual    Subjective   History of Present Illness    Angelo Lay is a 50 y.o.  who presents for annual exam. Prior endometrial ablation years ago (done elsewhere) but pt reports not having sterilization procedure. Later developed AUB. EMB benign 2018. Has been tx w/ progesterone only pills for cycle control and contraception.  Her menses are q mo, light flow, has occasional intermenstrual spotting but she desires to continue with this option. 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         Current contraception: oral progesterone-only contraceptive  History of abnormal Pap smear: yes - h/o cryo years ago  Received Gardasil immunization: no  Perform regular self breast exam: yes -    Family history of uterine or ovarian cancer: no  Family History of colon cancer: no  Family history of breast cancer: yes - maternal aunt dx 71    Mammogram: done today.  Colonoscopy: recommended.  DEXA: not indicated.    Exercise: moderately active  Calcium/Vitamin D: adequate intake    The following portions of the patient's history were reviewed and updated as appropriate: allergies, current medications, past family history, past medical history, past social history, past surgical history and problem list.    Review of Systems   Constitutional: Negative.    Genitourinary:        Irregular spotting w/ micronor       Pertinent items are noted in HPI.     Objective   Physical Exam    /76   Ht 167.6 cm (66\")   Wt 129 kg (284 lb 3.2 oz)   LMP 2021 (Exact Date)   Breastfeeding No   " BMI 45.87 kg/m²     General:   alert, appears stated age and cooperative   Neck: no asymmetry, masses, or scars   Heart: regular rate and rhythm, S1, S2 normal, no murmur, click, rub or gallop   Lungs: clear to auscultation bilaterally   Abdomen: soft, non-tender, without masses or organomegaly, obese   Breast: inspection negative, no nipple discharge or bleeding, no masses or nodularity palpable   Vulva: normal, Bartholin's, Urethra, [place]'s normal   Vagina: normal mucosa   Cervix: no cervical motion tenderness, no lesions and stenotic os, scarring consistent w/ prior cyrotherapy   Uterus: normal size, mobile, non-tender, normal shape and consistency   Adnexa: normal adnexa and no mass, fullness, tenderness   Rectal: not indicated     Assessment/Plan   Diagnoses and all orders for this visit:    1. Well woman exam with routine gynecological exam (Primary)    2. Encounter for surveillance of contraceptive pills  -     norethindrone (MICRONOR) 0.35 MG tablet; Take 1 tablet by mouth Daily.  Dispense: 28 tablet; Refill: 12    3. Screening for colon cancer  -     Ambulatory Referral For Screening Colonoscopy    continue progesterone only pills for contraception, h/o menorrhagia and to protect the endometrium    Breast self exam technique reviewed and patient encouraged to perform self-exam monthly.  Discussed healthy lifestyle modifications.  Pap smear up to date    Pt notified that I am leaving the practice, offered to schedule f/u annual w/ lucretia PENALOZA, Dr Colmenares, pt upset and doesn't want to schedule f/u yet, she will consider options

## 2021-09-29 ENCOUNTER — TELEPHONE (OUTPATIENT)
Dept: OBSTETRICS AND GYNECOLOGY | Age: 51
End: 2021-09-29

## 2021-09-29 NOTE — TELEPHONE ENCOUNTER
Pt also calls asking for her mammogram and pap results from 07/2021. Mammogram report is in chart, I do not see her pap in labs. Please advise

## 2021-09-29 NOTE — TELEPHONE ENCOUNTER
Pt calls with c/o discharge and slight odor. Pt asking for diflucan to be called in. Pt states Dr Pandya had sent in a script for diflucan with refills to fill as needed for yeast infections and pt informed the script has . Please advise pharmacy verified

## 2021-09-30 RX ORDER — FLUCONAZOLE 150 MG/1
150 TABLET ORAL ONCE
Qty: 1 TABLET | Refills: 2 | Status: SHIPPED | OUTPATIENT
Start: 2021-09-30 | End: 2021-09-30

## 2021-09-30 NOTE — TELEPHONE ENCOUNTER
Spoke w/pt and notified her of mammogram results and pt verbalized understanding of need to follow up w/screening mammogram in one year.  I offered to mail pt a copy of her results and she declined.  Pt would like rx for yeast infection sent to pharmacy.  LOV 07/28/2021.  Pharmacy verified.

## 2021-09-30 NOTE — TELEPHONE ENCOUNTER
Pt notified, pap was not preformed on 07/28 apt with Dr. Pandya, pt would like to know MG results has not received letter.  Pt is c/o yeast infection, irritation, has taken antibiotic prior and is finished with medication.

## 2021-10-28 ENCOUNTER — OFFICE VISIT (OUTPATIENT)
Dept: OBSTETRICS AND GYNECOLOGY | Age: 51
End: 2021-10-28

## 2021-10-28 VITALS
BODY MASS INDEX: 43.3 KG/M2 | SYSTOLIC BLOOD PRESSURE: 132 MMHG | HEIGHT: 66 IN | DIASTOLIC BLOOD PRESSURE: 84 MMHG | WEIGHT: 269.4 LBS

## 2021-10-28 DIAGNOSIS — E11.69 TYPE 2 DIABETES MELLITUS WITH OTHER SPECIFIED COMPLICATION, UNSPECIFIED WHETHER LONG TERM INSULIN USE (HCC): ICD-10-CM

## 2021-10-28 DIAGNOSIS — N89.8 VAGINAL DISCHARGE: Primary | ICD-10-CM

## 2021-10-28 DIAGNOSIS — L73.9 FOLLICULITIS: ICD-10-CM

## 2021-10-28 PROCEDURE — 99214 OFFICE O/P EST MOD 30 MIN: CPT | Performed by: STUDENT IN AN ORGANIZED HEALTH CARE EDUCATION/TRAINING PROGRAM

## 2021-10-28 RX ORDER — SULFAMETHOXAZOLE AND TRIMETHOPRIM 800; 160 MG/1; MG/1
2 TABLET ORAL 2 TIMES DAILY
Qty: 28 TABLET | Refills: 0 | Status: SHIPPED | OUTPATIENT
Start: 2021-10-28 | End: 2021-11-04

## 2021-10-28 NOTE — PROGRESS NOTES
[facility]   Obstetrics and Gynecology     10/28/2021      Patient:  Angelo Lay   MR#:[id number]    Office note    Chief Complaint   Patient presents with   • Follow-up     Gyn follow up c/o outer vaginal irritation       Subjective     History of Present Illness  50 y.o. female  presents with painful bumps on groin and vaginal itching.  Reports bumps look like boils and presented a few weeks ago.  She had similar boils in her axillas in the past.  She thinks one of them may have drained recently.  She also reports vaginal itching, mostly on outside.  She took over-the-counter yeast infection cream and then one Diflucan, which improved symptoms temporarily.    She uses [employer], last about 2 months ago, and shaves intermittently but not recently.  Reports regular monthly menses and denies any menopausal symptoms.    History of diabetes, well controlled with Metformin.      Patient Active Problem List   Diagnosis   • Type 2 diabetes mellitus (HCC)       Past Medical History:   Diagnosis Date   • Abnormal Pap smear of cervix     tx w/ cryo in 20s, f/u wnl   • Diabetes mellitus (HCC)    • Hyperlipidemia      Past Surgical History:   Procedure Laterality Date   • ENDOMETRIAL ABLATION       Obstetric History:  OB History        2    Para   1    Term   1            AB   1    Living   1       SAB        IAB        Ectopic        Molar        Multiple        Live Births   1               Menstrual History:     Patient's last menstrual period was 2021 (approximate).       # 1 - Date: None, Sex: Female, Weight: None, GA: None, Delivery: Vaginal, Spontaneous, Apgar1: None, Apgar5: None, Living: Living, Birth Comments: None    # 2 - Date: None, Sex: None, Weight: None, GA: None, Delivery: None, Apgar1: None, Apgar5: None, Living: None, Birth Comments: VIP    Family History   Problem Relation Age of Onset   • Breast cancer Maternal Aunt    • Heart disease Father    • No Known Problems  "Mother    • Cancer Paternal Grandmother    • Heart disease Maternal Grandmother    • Heart disease Maternal Grandfather    • Ovarian cancer Neg Hx    • Uterine cancer Neg Hx    • Colon cancer Neg Hx    • Deep vein thrombosis Neg Hx    • Pulmonary embolism Neg Hx      Social History     Tobacco Use   • Smoking status: Never Smoker   • Smokeless tobacco: Never Used   Vaping Use   • Vaping Use: Never used   Substance Use Topics   • Alcohol use: Yes     Comment: Occasional   • Drug use: No     Patient has no known allergies.    Current Outpatient Medications:   •  atorvastatin (LIPITOR) 40 MG tablet, TK 1 T PO HS, Disp: , Rfl: 3  •  metFORMIN (GLUCOPHAGE) 1000 MG tablet, Take 1,000 mg by mouth 2 (Two) Times a Day With Meals., Disp: , Rfl:   •  norethindrone (MICRONOR) 0.35 MG tablet, Take 1 tablet by mouth Daily., Disp: 28 tablet, Rfl: 12  •  sulfamethoxazole-trimethoprim (Bactrim DS) 800-160 MG per tablet, Take 2 tablets by mouth 2 (Two) Times a Day for 7 days., Disp: 28 tablet, Rfl: 0    The following portions of the patient's history were reviewed and updated as appropriate: allergies, current medications, past family history, past medical history, past social history, past surgical history and problem list.    Review of Systems   Genitourinary: Positive for genital sores and vaginal discharge.   All other systems reviewed and are negative.      BP Readings from Last 3 Encounters:   10/28/21 132/84   07/28/21 140/76   07/08/20 132/84      Wt Readings from Last 3 Encounters:   10/28/21 122 kg (269 lb 6.4 oz)   07/28/21 129 kg (284 lb 3.2 oz)   07/08/20 127 kg (280 lb)      BMI: Estimated body mass index is 43.48 kg/m² as calculated from the following:    Height as of this encounter: 167.6 cm (66\").    Weight as of this encounter: 122 kg (269 lb 6.4 oz). BSA: Estimated body surface area is 2.27 meters squared as calculated from the following:    Height as of this encounter: 167.6 cm (66\").    Weight as of this " encounter: 122 kg (269 lb 6.4 oz).    Objective   Physical Exam  Vitals and nursing note reviewed. Exam conducted with a chaperone present.   Constitutional:       General: She is not in acute distress.     Appearance: Normal appearance. She is obese.   Pulmonary:      Effort: Pulmonary effort is normal.   Genitourinary:     General: Normal vulva.      Vagina: Vaginal discharge (thick, white) present.      Cervix: Normal.      Uterus: Normal.           Comments: Two dime-sized lesions on inner aspect of right thigh, tender, nonfluctuant, 6cm induration each  Neurological:      Mental Status: She is alert.         Assessment/Plan     Diagnoses and all orders for this visit:    1. Vaginal discharge (Primary)  -     NuSwab VG+ - Swab, Vagina; Future  -     NuSwab VG+ - Swab, Vagina  -Appears consistent with yeast infection but patient has already taken over-the-counter vaginal cream and Diflucan x1  -nuswab collected today, will treat accordingly  -If yeast treatment needed, plan for extended Diflucan course    2. Folliculitis  -Discussed findings of folliculitis, no fluid collection that can be drained today though  -Discussed warm compresses or warm baths twice daily, drying off the area well afterwards, and applying Neosporin  -Unable to rule out MRSA based on patient's report of purulent drainage > Rx Bactrim DS x2 tablets twice daily for 7 days sent to pharmacy    3. Type 2 diabetes mellitus with other specified complication, unspecified whether long term insulin use (HCC)  -Discussed that recurrent skin infections and yeast infections may be reflective of worsening glycemic control.  Patient reports noncompliance with Metformin.  A1c trending up, last 8.3% 8/2021  -Recommend making follow-up with PCP to discuss optimizing treatment    Other orders  -     sulfamethoxazole-trimethoprim (Bactrim DS) 800-160 MG per tablet; Take 2 tablets by mouth 2 (Two) Times a Day for 7 days.  Dispense: 28 tablet; Refill:  0      Return in about 4 weeks (around 11/25/2021) for Recheck.    Lindy Colmenares MD   10/28/2021 10:31 EDT

## 2021-10-31 LAB
A VAGINAE DNA VAG QL NAA+PROBE: ABNORMAL SCORE
BVAB2 DNA VAG QL NAA+PROBE: ABNORMAL SCORE
C ALBICANS DNA VAG QL NAA+PROBE: POSITIVE
C GLABRATA DNA VAG QL NAA+PROBE: NEGATIVE
C TRACH DNA VAG QL NAA+PROBE: NEGATIVE
MEGA1 DNA VAG QL NAA+PROBE: ABNORMAL SCORE
N GONORRHOEA DNA VAG QL NAA+PROBE: NEGATIVE
T VAGINALIS DNA VAG QL NAA+PROBE: NEGATIVE

## 2021-11-01 RX ORDER — FLUCONAZOLE 150 MG/1
150 TABLET ORAL ONCE
Qty: 2 TABLET | Refills: 0 | Status: SHIPPED | OUTPATIENT
Start: 2021-11-01 | End: 2021-11-01

## 2021-12-02 ENCOUNTER — OFFICE VISIT (OUTPATIENT)
Dept: OBSTETRICS AND GYNECOLOGY | Age: 51
End: 2021-12-02

## 2021-12-02 VITALS
BODY MASS INDEX: 42.33 KG/M2 | DIASTOLIC BLOOD PRESSURE: 80 MMHG | HEIGHT: 66 IN | SYSTOLIC BLOOD PRESSURE: 124 MMHG | WEIGHT: 263.4 LBS

## 2021-12-02 DIAGNOSIS — R30.0 BURNING WITH URINATION: Primary | ICD-10-CM

## 2021-12-02 DIAGNOSIS — N89.8 VAGINAL IRRITATION: ICD-10-CM

## 2021-12-02 LAB
BILIRUB BLD-MCNC: NEGATIVE MG/DL
CLARITY, POC: CLEAR
COLOR UR: YELLOW
GLUCOSE UR STRIP-MCNC: ABNORMAL MG/DL
KETONES UR QL: ABNORMAL
LEUKOCYTE EST, POC: NEGATIVE
NITRITE UR-MCNC: NEGATIVE MG/ML
PH UR: 5.5 [PH] (ref 5–8)
PROT UR STRIP-MCNC: NEGATIVE MG/DL
RBC # UR STRIP: NEGATIVE /UL
SP GR UR: 1.01 (ref 1–1.03)
UROBILINOGEN UR QL: NORMAL

## 2021-12-02 PROCEDURE — 81002 URINALYSIS NONAUTO W/O SCOPE: CPT | Performed by: STUDENT IN AN ORGANIZED HEALTH CARE EDUCATION/TRAINING PROGRAM

## 2021-12-02 PROCEDURE — 99213 OFFICE O/P EST LOW 20 MIN: CPT | Performed by: STUDENT IN AN ORGANIZED HEALTH CARE EDUCATION/TRAINING PROGRAM

## 2021-12-02 RX ORDER — FLUCONAZOLE 150 MG/1
TABLET ORAL
COMMUNITY
Start: 2021-11-01 | End: 2021-12-02

## 2021-12-02 RX ORDER — NYSTATIN 100000 U/G
1 OINTMENT TOPICAL 2 TIMES DAILY
Qty: 30 G | Refills: 1 | Status: SHIPPED | OUTPATIENT
Start: 2021-12-02

## 2021-12-02 NOTE — PROGRESS NOTES
[facility]   Obstetrics and Gynecology     2021      Patient:  Angelo Lay   MR#:[id number]    Office note    Chief Complaint   Patient presents with   • Follow-up     Gyn follow up c/o outer vaginal irritation, burning w/urination       Subjective     History of Present Illness  51 y.o. female  presents for follow-up of vaginal irritation.  At visit on 1028, patient had thick white vaginal discharge that tested positive for Candida albicans, which was treated with Diflucan x2.  She also had 2 boils on right inner thigh that were treated with Bactrim.  Those have now resolved.    She is now experiencing irritation of whole vulva.  Denies dysuria but it hurts when the urine hits her skin.  She has also noticed white plaque caking vulva and groin.    She has not yet made appt with PCP for Diabetes management.    Relevant data reviewed:  Nuab VG+ - Swab, Vagina (10/28/2021 09:11)    Patient Active Problem List   Diagnosis   • Type 2 diabetes mellitus (HCC)       Past Medical History:   Diagnosis Date   • Abnormal Pap smear of cervix     tx w/ cryo in 20s, f/u wnl   • Diabetes mellitus (HCC)    • Hyperlipidemia      Past Surgical History:   Procedure Laterality Date   • ENDOMETRIAL ABLATION       Obstetric History:  OB History        2    Para   1    Term   1            AB   1    Living   1       SAB        IAB        Ectopic        Molar        Multiple        Live Births   1               Menstrual History:     Patient's last menstrual period was 10/31/2021 (approximate).       # 1 - Date: None, Sex: Female, Weight: None, GA: None, Delivery: Vaginal, Spontaneous, Apgar1: None, Apgar5: None, Living: Living, Birth Comments: None    # 2 - Date: None, Sex: None, Weight: None, GA: None, Delivery: None, Apgar1: None, Apgar5: None, Living: None, Birth Comments: VIP    Family History   Problem Relation Age of Onset   • Breast cancer Maternal Aunt    • Heart disease Father    • No  "Known Problems Mother    • Cancer Paternal Grandmother    • Heart disease Maternal Grandmother    • Heart disease Maternal Grandfather    • Ovarian cancer Neg Hx    • Uterine cancer Neg Hx    • Colon cancer Neg Hx    • Deep vein thrombosis Neg Hx    • Pulmonary embolism Neg Hx      Social History     Tobacco Use   • Smoking status: Never Smoker   • Smokeless tobacco: Never Used   Vaping Use   • Vaping Use: Never used   Substance Use Topics   • Alcohol use: Yes     Comment: Occasional   • Drug use: No     Patient has no known allergies.    Current Outpatient Medications:   •  atorvastatin (LIPITOR) 40 MG tablet, TK 1 T PO HS, Disp: , Rfl: 3  •  metFORMIN (GLUCOPHAGE) 1000 MG tablet, Take 1,000 mg by mouth 2 (Two) Times a Day With Meals., Disp: , Rfl:   •  norethindrone (MICRONOR) 0.35 MG tablet, Take 1 tablet by mouth Daily., Disp: 28 tablet, Rfl: 12  •  fluconazole (DIFLUCAN) 150 MG tablet, TAKE 1 TABLET BY MOUTH 1 TIME FOR 1 DOSE. REPEAT IN 3 DAYS, Disp: , Rfl:   •  nystatin (MYCOSTATIN) 672988 UNIT/GM ointment, Apply 1 application topically to the appropriate area as directed 2 (Two) Times a Day., Disp: 30 g, Rfl: 1    The following portions of the patient's history were reviewed and updated as appropriate: allergies, current medications, past family history, past medical history, past social history, past surgical history and problem list.    Review of Systems   Genitourinary: Positive for genital sores and vaginal pain.   All other systems reviewed and are negative.      BP Readings from Last 3 Encounters:   12/02/21 124/80   10/28/21 132/84   07/28/21 140/76      Wt Readings from Last 3 Encounters:   12/02/21 119 kg (263 lb 6.4 oz)   10/28/21 122 kg (269 lb 6.4 oz)   07/28/21 129 kg (284 lb 3.2 oz)      BMI: Estimated body mass index is 42.51 kg/m² as calculated from the following:    Height as of this encounter: 167.6 cm (66\").    Weight as of this encounter: 119 kg (263 lb 6.4 oz). BSA: Estimated body surface " "area is 2.24 meters squared as calculated from the following:    Height as of this encounter: 167.6 cm (66\").    Weight as of this encounter: 119 kg (263 lb 6.4 oz).    Objective   Physical Exam  Vitals and nursing note reviewed.   Constitutional:       General: She is not in acute distress.     Appearance: Normal appearance.   Pulmonary:      Effort: Pulmonary effort is normal.   Abdominal:      Palpations: Abdomen is soft.      Tenderness: There is no abdominal tenderness. There is no guarding or rebound.   Genitourinary:     Vagina: Normal. No vaginal discharge.      Cervix: Normal.      Comments: Whole vulva and periclitoral area raw, inflamed, covered in white plaque; white plaque also present in bilateral groin; no signs of bacterial infection  Neurological:      Mental Status: She is alert.         Assessment/Plan     Diagnoses and all orders for this visit:    1. Burning with urination (Primary)  -     POC Urinalysis Dipstick  -     NuSwab BV & Candida - Swab, Vagina    2. Vaginal irritation  -     NuSwab BV & Candida - Swab, Vagina    Other orders  -     nystatin (MYCOSTATIN) 636848 UNIT/GM ointment; Apply 1 application topically to the appropriate area as directed 2 (Two) Times a Day.  Dispense: 30 g; Refill: 1    -We discussed that the vaginal discharge is gone so is likely treated by Diflucan but she now has a superficial infection on her vulva and groin, also likely fungal, that is causing her irritation.  I do not see any signs of a secondary bacterial infection.  -We discussed trying nystatin ointment twice daily to affected area for 2 weeks to treat superficial candidiasis.  We may then need a barrier cream to prevent further infections.  -We also discussed that poorly controlled diabetes is likely the underlying cause of the recurrent yeast infections.  UA with 500 glucose.  She has not yet made appointment with primary care physician but states she will later today.  We discussed that improving her " glycemic control is paramount to preventing these infections.    RTC 2 wks to reevaluate symptoms    Lindy Colmenares MD   12/2/2021 13:04 EST

## 2021-12-05 LAB
A VAGINAE DNA VAG QL NAA+PROBE: ABNORMAL SCORE
BVAB2 DNA VAG QL NAA+PROBE: ABNORMAL SCORE
C ALBICANS DNA VAG QL NAA+PROBE: POSITIVE
C GLABRATA DNA VAG QL NAA+PROBE: NEGATIVE
MEGA1 DNA VAG QL NAA+PROBE: ABNORMAL SCORE

## 2021-12-07 RX ORDER — FLUCONAZOLE 150 MG/1
TABLET ORAL
Qty: 30 TABLET | Refills: 0 | Status: SHIPPED | OUTPATIENT
Start: 2021-12-07

## 2021-12-07 NOTE — PROGRESS NOTES
Called patient to discuss recurrent Candida albicans infection.  She reports that vulvar irritation is getting slowly better with nystatin ointment but still present.  She also reports that she went to PCP yesterday for diabetes.  She got lots of blood work collected and was started on oral medication.  Neck scheduled follow-up in February with PCP.    We discussed that she has had so many Candida albicans infections, that we should do a prolonged treatment at this point.  Plan for Diflucan every 72 hours x3 doses then weekly for next 6 months.  Patient amenable.  Next appt with me 12/22 so will check in on symptoms then.

## 2021-12-22 ENCOUNTER — OFFICE VISIT (OUTPATIENT)
Dept: OBSTETRICS AND GYNECOLOGY | Age: 51
End: 2021-12-22

## 2021-12-22 VITALS
HEIGHT: 66 IN | WEIGHT: 262.4 LBS | SYSTOLIC BLOOD PRESSURE: 118 MMHG | DIASTOLIC BLOOD PRESSURE: 64 MMHG | BODY MASS INDEX: 42.17 KG/M2

## 2021-12-22 DIAGNOSIS — E11.69 TYPE 2 DIABETES MELLITUS WITH OTHER SPECIFIED COMPLICATION, UNSPECIFIED WHETHER LONG TERM INSULIN USE (HCC): ICD-10-CM

## 2021-12-22 DIAGNOSIS — N89.8 VAGINAL IRRITATION: Primary | ICD-10-CM

## 2021-12-22 PROCEDURE — 99213 OFFICE O/P EST LOW 20 MIN: CPT | Performed by: STUDENT IN AN ORGANIZED HEALTH CARE EDUCATION/TRAINING PROGRAM

## 2021-12-22 RX ORDER — GLIPIZIDE 5 MG/1
5 TABLET ORAL
COMMUNITY
Start: 2021-12-06 | End: 2022-06-22

## 2021-12-22 RX ORDER — BLOOD SUGAR DIAGNOSTIC
STRIP MISCELLANEOUS
COMMUNITY
Start: 2021-12-08

## 2021-12-22 NOTE — PROGRESS NOTES
[facility]   Obstetrics and Gynecology     2021      Patient:  Angelo Lay   MR#:[id number]    Office note    Chief Complaint   Patient presents with   • Follow-up     Gyn follow up c/o outer vaginal irritation       Subjective     History of Present Illness  51 y.o. female  presents for f/u of vulvar irritation and recurrent yeast infections.The vulvar itching and white plaque have totally resolved with nystatin cream.  She has not been able to  po diflucan for recurrent yeast infections yet due to pharmacy issues.    Saw PCP  - started metformin 1000mg BID and glipizide 5mg qdaily; f/u scheduled 22  HbA1c 15.9% 21 (up from 8.3% on 8/3/21)      Patient Active Problem List   Diagnosis   • Type 2 diabetes mellitus (HCC)       Past Medical History:   Diagnosis Date   • Abnormal Pap smear of cervix     tx w/ cryo in 20s, f/u wnl   • Diabetes mellitus (HCC)    • Hyperlipidemia      Past Surgical History:   Procedure Laterality Date   • ENDOMETRIAL ABLATION       Obstetric History:  OB History        2    Para   1    Term   1            AB   1    Living   1       SAB        IAB        Ectopic        Molar        Multiple        Live Births   1               Menstrual History:     Patient's last menstrual period was 10/31/2021 (approximate).       # 1 - Date: None, Sex: None, Weight: None, GA: None, Delivery: None, Apgar1: None, Apgar5: None, Living: None, Birth Comments: VIP    # 2 - Date: 93, Sex: Female, Weight: 3629 g (8 lb), GA: None, Delivery: Vaginal, Spontaneous, Apgar1: None, Apgar5: None, Living: Living, Birth Comments: None    Family History   Problem Relation Age of Onset   • Breast cancer Maternal Aunt    • Heart disease Father    • No Known Problems Mother    • Cancer Paternal Grandmother    • Heart disease Maternal Grandmother    • Heart disease Maternal Grandfather    • Ovarian cancer Neg Hx    • Uterine cancer Neg Hx    • Colon cancer  "Neg Hx    • Deep vein thrombosis Neg Hx    • Pulmonary embolism Neg Hx      Social History     Tobacco Use   • Smoking status: Never Smoker   • Smokeless tobacco: Never Used   Vaping Use   • Vaping Use: Never used   Substance Use Topics   • Alcohol use: Yes     Comment: Occasional   • Drug use: No     Patient has no known allergies.    Current Outpatient Medications:   •  Accu-Chek Guide test strip, USE TO TEST BLOOD GLUCOSE THREE TIMES DAILY, Disp: , Rfl:   •  atorvastatin (LIPITOR) 40 MG tablet, TK 1 T PO HS, Disp: , Rfl: 3  •  glipizide (GLUCOTROL) 5 MG tablet, Take 5 mg by mouth., Disp: , Rfl:   •  glucose blood test strip, 1 strip by Other route., Disp: , Rfl:   •  metFORMIN (GLUCOPHAGE) 1000 MG tablet, Take 1,000 mg by mouth 2 (Two) Times a Day With Meals., Disp: , Rfl:   •  norethindrone (MICRONOR) 0.35 MG tablet, Take 1 tablet by mouth Daily., Disp: 28 tablet, Rfl: 12  •  nystatin (MYCOSTATIN) 363694 UNIT/GM ointment, Apply 1 application topically to the appropriate area as directed 2 (Two) Times a Day., Disp: 30 g, Rfl: 1  •  fluconazole (Diflucan) 150 MG tablet, Take one tab every 72 hours for 3 doses then once weekly for 6 months., Disp: 30 tablet, Rfl: 0    The following portions of the patient's history were reviewed and updated as appropriate: allergies, current medications, past family history, past medical history, past social history, past surgical history and problem list.    Review of Systems   All other systems reviewed and are negative.      BP Readings from Last 3 Encounters:   12/22/21 118/64   12/02/21 124/80   10/28/21 132/84      Wt Readings from Last 3 Encounters:   12/22/21 119 kg (262 lb 6.4 oz)   12/02/21 119 kg (263 lb 6.4 oz)   10/28/21 122 kg (269 lb 6.4 oz)      BMI: Estimated body mass index is 42.35 kg/m² as calculated from the following:    Height as of this encounter: 167.6 cm (66\").    Weight as of this encounter: 119 kg (262 lb 6.4 oz). BSA: Estimated body surface area is 2.24 " "meters squared as calculated from the following:    Height as of this encounter: 167.6 cm (66\").    Weight as of this encounter: 119 kg (262 lb 6.4 oz).    Objective   Physical Exam  Vitals and nursing note reviewed.   Constitutional:       General: She is not in acute distress.     Appearance: Normal appearance.   Pulmonary:      Effort: Pulmonary effort is normal.   Genitourinary:     General: Normal vulva.      Vagina: Normal.      Comments: No white plaque or rawness anymore  Neurological:      Mental Status: She is alert.         Assessment/Plan     Diagnoses and all orders for this visit:    1. Vaginal irritation (Primary)    2. Type 2 diabetes mellitus with other specified complication, unspecified whether long term insulin use (HCC)    -A1c increased from 8 to 16% over the last 4 months so this is undeniably because of her recurrent fungal infections; she has now started Metformin and glyburide and has follow-up with PCP in February; encouraged compliance  -Discussed that she can use nystatin cream if the vulvar irritation recurs  -Bernice called pharmacy to confirm prescription is being prepared, they report it will be ready at 1 PM this afternoon  -RTC 6 months after recurrent Diflucan treatment complete to assess symptoms    Return in about 6 months (around 6/22/2022) for Next scheduled follow up.    Lindy Colmenares MD   12/22/2021 12:22 EST  "

## 2022-06-22 ENCOUNTER — OFFICE VISIT (OUTPATIENT)
Dept: OBSTETRICS AND GYNECOLOGY | Age: 52
End: 2022-06-22

## 2022-06-22 VITALS
DIASTOLIC BLOOD PRESSURE: 74 MMHG | SYSTOLIC BLOOD PRESSURE: 122 MMHG | BODY MASS INDEX: 44.58 KG/M2 | HEIGHT: 66 IN | WEIGHT: 277.4 LBS

## 2022-06-22 DIAGNOSIS — N76.1 CHRONIC VULVOVAGINITIS: Primary | ICD-10-CM

## 2022-06-22 PROCEDURE — 99213 OFFICE O/P EST LOW 20 MIN: CPT | Performed by: STUDENT IN AN ORGANIZED HEALTH CARE EDUCATION/TRAINING PROGRAM

## 2022-06-22 RX ORDER — DAPAGLIFLOZIN 5 MG/1
TABLET, FILM COATED ORAL
COMMUNITY
Start: 2022-06-13

## 2022-06-22 NOTE — PROGRESS NOTES
[facility]   Obstetrics and Gynecology     2022      Patient:  Angelo Lay   MR#:[id number]    Office note    Chief Complaint   Patient presents with   • Follow-up     Gyn follow up c/o outer vaginal irritation, No problems today       Subjective     History of Present Illness  51 y.o. female  presents for follow-up of recurrent yeast infections and chronic vulvovaginitis.  She is doing much better.  She has now done 6 months of a suppressive Diflucan regimen.  She does admit missing some weekly treatments.  Overall, much better.  Diabetic control has also improved.    Studies reviewed:  HEMOGLOBIN A1C (2022 09:18)        Patient Active Problem List   Diagnosis   • Type 2 diabetes mellitus (HCC)       Past Medical History:   Diagnosis Date   • Abnormal Pap smear of cervix     tx w/ cryo in 20s, f/u wnl   • Diabetes mellitus (HCC)    • Hyperlipidemia      Past Surgical History:   Procedure Laterality Date   • ENDOMETRIAL ABLATION       Obstetric History:  OB History        2    Para   1    Term   1            AB   1    Living   1       SAB        IAB        Ectopic        Molar        Multiple        Live Births   1               Menstrual History:     Patient's last menstrual period was 2022 (approximate).       # 1 - Date: None, Sex: None, Weight: None, GA: None, Delivery: None, Apgar1: None, Apgar5: None, Living: None, Birth Comments: VIP    # 2 - Date: [birth year], Sex: Female, Weight: 3629 g (8 lb), GA: None, Delivery: Vaginal, Spontaneous, Apgar1: None, Apgar5: None, Living: Living, Birth Comments: None    Family History   Problem Relation Age of Onset   • Breast cancer Maternal Aunt    • Heart disease Father    • No Known Problems Mother    • Cancer Paternal Grandmother    • Heart disease Maternal Grandmother    • Heart disease Maternal Grandfather    • Ovarian cancer Neg Hx    • Uterine cancer Neg Hx    • Colon cancer Neg Hx    • Deep vein thrombosis Neg  "Hx    • Pulmonary embolism Neg Hx      Social History     Tobacco Use   • Smoking status: Never Smoker   • Smokeless tobacco: Never Used   Vaping Use   • Vaping Use: Never used   Substance Use Topics   • Alcohol use: Yes     Comment: Occasional   • Drug use: No     Patient has no known allergies.    Current Outpatient Medications:   •  Accu-Chek Guide test strip, USE TO TEST BLOOD GLUCOSE THREE TIMES DAILY, Disp: , Rfl:   •  atorvastatin (LIPITOR) 40 MG tablet, TK 1 T PO HS, Disp: , Rfl: 3  •  Farxiga 5 MG tablet tablet, , Disp: , Rfl:   •  fluconazole (Diflucan) 150 MG tablet, Take one tab every 72 hours for 3 doses then once weekly for 6 months., Disp: 30 tablet, Rfl: 0  •  glucose blood test strip, 1 strip by Other route., Disp: , Rfl:   •  metFORMIN (GLUCOPHAGE) 1000 MG tablet, Take 1,000 mg by mouth 2 (Two) Times a Day With Meals., Disp: , Rfl:   •  norethindrone (MICRONOR) 0.35 MG tablet, Take 1 tablet by mouth Daily., Disp: 28 tablet, Rfl: 12  •  nystatin (MYCOSTATIN) 250866 UNIT/GM ointment, Apply 1 application topically to the appropriate area as directed 2 (Two) Times a Day., Disp: 30 g, Rfl: 1    The following portions of the patient's history were reviewed and updated as appropriate: allergies, current medications, past family history, past medical history, past social history, past surgical history and problem list.    Review of Systems   All other systems reviewed and are negative.      BP Readings from Last 3 Encounters:   06/22/22 122/74   12/22/21 118/64   12/02/21 124/80      Wt Readings from Last 3 Encounters:   06/22/22 126 kg (277 lb 6.4 oz)   12/22/21 119 kg (262 lb 6.4 oz)   12/02/21 119 kg (263 lb 6.4 oz)      BMI: Estimated body mass index is 44.77 kg/m² as calculated from the following:    Height as of this encounter: 167.6 cm (66\").    Weight as of this encounter: 126 kg (277 lb 6.4 oz). BSA: Estimated body surface area is 2.3 meters squared as calculated from the following:    Height as " "of this encounter: 167.6 cm (66\").    Weight as of this encounter: 126 kg (277 lb 6.4 oz).    Objective   Physical Exam  Vitals and nursing note reviewed.   Constitutional:       General: She is not in acute distress.     Appearance: Normal appearance.   Genitourinary:     General: Normal vulva.      Vagina: Normal. No vaginal discharge, tenderness or lesions.   Neurological:      General: No focal deficit present.      Mental Status: She is alert.   Psychiatric:         Mood and Affect: Mood normal.         Behavior: Behavior normal.         Thought Content: Thought content normal.         Judgment: Judgment normal.         Assessment & Plan     Diagnoses and all orders for this visit:    1. Chronic vulvovaginitis (Primary)    - Symptoms have finally resolved.  Discussed weaning off of Diflucan to avoid Azole resistance.  Encouraged continued diabetic control.  - Mammogram scheduled 8/24/2022, Pap up-to-date, plan for next annual exam in 2023 with mammo    Return in about 14 months (around 8/7/2023) for Annual w/ mammo.    I spent 20 minutes caring for Angelo Lay on this date of service. This time includes time spent by me in the following activities: preparing for the visit, reviewing tests, obtaining and/or reviewing a separately obtained history, performing a medically appropriate examination and/or evaluation, counseling and educating the patient/family/caregiver, ordering medications, tests, or procedures and documenting information in the medical record.    Lindy Colmenares MD   6/22/2022 10:58 EDT  "

## 2022-07-21 ENCOUNTER — TELEPHONE (OUTPATIENT)
Dept: OBSTETRICS AND GYNECOLOGY | Age: 52
End: 2022-07-21

## 2022-07-21 DIAGNOSIS — Z30.41 ENCOUNTER FOR SURVEILLANCE OF CONTRACEPTIVE PILLS: ICD-10-CM

## 2022-07-21 RX ORDER — ACETAMINOPHEN AND CODEINE PHOSPHATE 120; 12 MG/5ML; MG/5ML
1 SOLUTION ORAL DAILY
Qty: 28 TABLET | Refills: 12 | Status: SHIPPED | OUTPATIENT
Start: 2022-07-21 | End: 2022-10-07

## 2022-07-21 NOTE — TELEPHONE ENCOUNTER
Patient is needing refills on her Micronor.She has had multiple appt.with  and was told she did not need to come back until next year for her AC.Pharmacy verified.[facility] 7/21.

## 2022-08-04 ENCOUNTER — PROCEDURE VISIT (OUTPATIENT)
Dept: OBSTETRICS AND GYNECOLOGY | Age: 52
End: 2022-08-04

## 2022-08-04 ENCOUNTER — APPOINTMENT (OUTPATIENT)
Dept: WOMENS IMAGING | Facility: HOSPITAL | Age: 52
End: 2022-08-04

## 2022-08-04 DIAGNOSIS — Z12.31 VISIT FOR SCREENING MAMMOGRAM: Primary | ICD-10-CM

## 2022-08-04 PROCEDURE — 77063 BREAST TOMOSYNTHESIS BI: CPT | Performed by: RADIOLOGY

## 2022-08-04 PROCEDURE — 77063 BREAST TOMOSYNTHESIS BI: CPT | Performed by: STUDENT IN AN ORGANIZED HEALTH CARE EDUCATION/TRAINING PROGRAM

## 2022-08-04 PROCEDURE — 77067 SCR MAMMO BI INCL CAD: CPT | Performed by: RADIOLOGY

## 2022-08-04 PROCEDURE — 77067 SCR MAMMO BI INCL CAD: CPT | Performed by: STUDENT IN AN ORGANIZED HEALTH CARE EDUCATION/TRAINING PROGRAM

## 2022-08-08 DIAGNOSIS — R92.8 ABNORMAL MAMMOGRAM: Primary | ICD-10-CM

## 2022-08-10 NOTE — PROGRESS NOTES
I spoke with patient and discussed calcifications in both breasts and recommendation for bilateral diagnostic mammogram.  Scheduled tomorrow.

## 2022-08-11 ENCOUNTER — APPOINTMENT (OUTPATIENT)
Dept: WOMENS IMAGING | Facility: HOSPITAL | Age: 52
End: 2022-08-11

## 2022-08-11 PROCEDURE — 77062 BREAST TOMOSYNTHESIS BI: CPT | Performed by: RADIOLOGY

## 2022-08-11 PROCEDURE — 77066 DX MAMMO INCL CAD BI: CPT | Performed by: RADIOLOGY

## 2022-08-11 PROCEDURE — G0279 TOMOSYNTHESIS, MAMMO: HCPCS | Performed by: RADIOLOGY

## 2022-08-24 ENCOUNTER — TELEPHONE (OUTPATIENT)
Dept: OBSTETRICS AND GYNECOLOGY | Age: 52
End: 2022-08-24

## 2022-08-25 DIAGNOSIS — R92.8 ABNORMAL MAMMOGRAM: Primary | ICD-10-CM

## 2022-08-25 NOTE — PROGRESS NOTES
Discussed report with patient and recommendation for bilateral breast stereotactic biopsies.  Orders placed.  Please let me know if you need anything else.

## 2022-08-31 ENCOUNTER — APPOINTMENT (OUTPATIENT)
Dept: WOMENS IMAGING | Facility: HOSPITAL | Age: 52
End: 2022-08-31

## 2022-08-31 PROCEDURE — 19081 BX BREAST 1ST LESION STRTCTC: CPT | Performed by: RADIOLOGY

## 2022-08-31 PROCEDURE — A4648 IMPLANTABLE TISSUE MARKER: HCPCS | Performed by: RADIOLOGY

## 2022-09-05 DIAGNOSIS — R92.8 ABNORMAL MAMMOGRAM: Primary | ICD-10-CM

## 2022-09-07 DIAGNOSIS — R92.8 ABNORMAL MAMMOGRAM: Primary | ICD-10-CM

## 2022-10-06 DIAGNOSIS — Z30.41 ENCOUNTER FOR SURVEILLANCE OF CONTRACEPTIVE PILLS: ICD-10-CM

## 2022-10-07 RX ORDER — NORETHINDRONE
KIT
Qty: 28 TABLET | Refills: 12 | Status: SHIPPED | OUTPATIENT
Start: 2022-10-07

## 2023-03-02 ENCOUNTER — APPOINTMENT (OUTPATIENT)
Dept: WOMENS IMAGING | Facility: HOSPITAL | Age: 53
End: 2023-03-02
Payer: COMMERCIAL

## 2023-03-02 PROCEDURE — G0279 TOMOSYNTHESIS, MAMMO: HCPCS | Performed by: RADIOLOGY

## 2023-03-02 PROCEDURE — 77066 DX MAMMO INCL CAD BI: CPT | Performed by: RADIOLOGY

## 2023-03-02 PROCEDURE — 77062 BREAST TOMOSYNTHESIS BI: CPT | Performed by: RADIOLOGY

## 2023-08-07 ENCOUNTER — HOSPITAL ENCOUNTER (OUTPATIENT)
Facility: HOSPITAL | Age: 53
Discharge: HOME | End: 2023-08-07
Admitting: STUDENT IN AN ORGANIZED HEALTH CARE EDUCATION/TRAINING PROGRAM
Payer: COMMERCIAL

## 2023-08-07 DIAGNOSIS — Z12.31 VISIT FOR SCREENING MAMMOGRAM: ICD-10-CM

## 2023-08-07 PROCEDURE — 77067 SCR MAMMO BI INCL CAD: CPT | Performed by: STUDENT IN AN ORGANIZED HEALTH CARE EDUCATION/TRAINING PROGRAM

## 2023-08-07 PROCEDURE — 77063 BREAST TOMOSYNTHESIS BI: CPT

## 2023-08-07 PROCEDURE — 77063 BREAST TOMOSYNTHESIS BI: CPT | Performed by: STUDENT IN AN ORGANIZED HEALTH CARE EDUCATION/TRAINING PROGRAM

## 2023-08-07 PROCEDURE — 77067 SCR MAMMO BI INCL CAD: CPT

## 2023-08-09 NOTE — PROGRESS NOTES
Please call patient to let her know her mammogram is normal and should be repeated in 1 year.  Thank you,  Lindy Colmenares MD  08/09/23 12:46 EDT

## 2023-08-20 DIAGNOSIS — Z30.41 ENCOUNTER FOR SURVEILLANCE OF CONTRACEPTIVE PILLS: ICD-10-CM

## 2023-08-21 RX ORDER — NORETHINDRONE 0.35 MG/1
TABLET ORAL
Qty: 84 TABLET | OUTPATIENT
Start: 2023-08-21

## 2023-11-06 RX ORDER — FLUCONAZOLE 150 MG/1
TABLET ORAL
Qty: 30 TABLET | Refills: 0 | Status: CANCELLED | OUTPATIENT
Start: 2023-11-06

## 2023-11-06 NOTE — TELEPHONE ENCOUNTER
Pt calling needing a refill for diflucan   Double O-Z Flap Text: The defect edges were debeveled with a #15 scalpel blade.  Given the location of the defect, shape of the defect and the proximity to free margins a Double O-Z flap was deemed most appropriate.  Using a sterile surgical marker, an appropriate transposition flap was drawn incorporating the defect and placing the expected incisions within the relaxed skin tension lines where possible. The area thus outlined was incised deep to adipose tissue with a #15 scalpel blade.  The skin margins were undermined to an appropriate distance in all directions utilizing iris scissors.

## 2023-11-08 RX ORDER — FLUCONAZOLE 150 MG/1
TABLET ORAL
Qty: 2 TABLET | Refills: 0 | Status: SHIPPED | OUTPATIENT
Start: 2023-11-08

## 2024-01-02 ENCOUNTER — TELEPHONE (OUTPATIENT)
Dept: OBSTETRICS AND GYNECOLOGY | Age: 54
End: 2024-01-02
Payer: COMMERCIAL

## 2024-01-02 DIAGNOSIS — Z12.31 VISIT FOR SCREENING MAMMOGRAM: Primary | ICD-10-CM

## 2024-01-02 DIAGNOSIS — Z30.41 ENCOUNTER FOR SURVEILLANCE OF CONTRACEPTIVE PILLS: ICD-10-CM

## 2024-01-02 RX ORDER — ACETAMINOPHEN AND CODEINE PHOSPHATE 120; 12 MG/5ML; MG/5ML
1 SOLUTION ORAL DAILY
Qty: 28 TABLET | Refills: 12 | Status: SHIPPED | OUTPATIENT
Start: 2024-01-02

## 2024-01-02 RX ORDER — FLUCONAZOLE 150 MG/1
TABLET ORAL
Qty: 2 TABLET | Refills: 0 | Status: SHIPPED | OUTPATIENT
Start: 2024-01-02

## 2024-01-02 RX ORDER — NYSTATIN 100000 U/G
1 OINTMENT TOPICAL 2 TIMES DAILY
Qty: 30 G | Refills: 1 | Status: SHIPPED | OUTPATIENT
Start: 2024-01-02

## 2024-01-02 NOTE — TELEPHONE ENCOUNTER
Pt calling asking for refill on her BC pill. Pt also stating she has frequent vaginal irritation due to her diabetes and would like to have diflucan Rx sent to pharmacy just incase as well as nystatin ointment. Pt last seen in office on 6/22/22 but has AE scheduled on 3/27/24. Pt pharmacy verified & on file. Please advise.

## 2024-01-02 NOTE — TELEPHONE ENCOUNTER
Pt notified: birth control, Diflucan, and nystatin ointment have been sent.  Understanding verbalized by pt.

## 2024-03-28 ENCOUNTER — OFFICE VISIT (OUTPATIENT)
Dept: OBSTETRICS AND GYNECOLOGY | Age: 54
End: 2024-03-28
Payer: COMMERCIAL

## 2024-03-28 VITALS
HEIGHT: 66 IN | WEIGHT: 259 LBS | SYSTOLIC BLOOD PRESSURE: 122 MMHG | DIASTOLIC BLOOD PRESSURE: 72 MMHG | BODY MASS INDEX: 41.62 KG/M2

## 2024-03-28 DIAGNOSIS — Z12.11 SCREEN FOR COLON CANCER: ICD-10-CM

## 2024-03-28 DIAGNOSIS — Z12.4 SCREENING FOR CERVICAL CANCER: ICD-10-CM

## 2024-03-28 DIAGNOSIS — E11.9 TYPE 2 DIABETES MELLITUS WITHOUT COMPLICATION, WITHOUT LONG-TERM CURRENT USE OF INSULIN: ICD-10-CM

## 2024-03-28 DIAGNOSIS — Z01.419 WELL WOMAN EXAM WITH ROUTINE GYNECOLOGICAL EXAM: Primary | ICD-10-CM

## 2024-03-28 DIAGNOSIS — Z30.41 ORAL CONTRACEPTIVE USE: ICD-10-CM

## 2024-03-28 LAB
ALBUMIN SERPL-MCNC: 4.6 G/DL (ref 3.5–5.2)
ALBUMIN/GLOB SERPL: 1.6 G/DL
ALP SERPL-CCNC: 121 U/L (ref 39–117)
ALT SERPL-CCNC: 16 U/L (ref 1–33)
AST SERPL-CCNC: 10 U/L (ref 1–32)
BILIRUB SERPL-MCNC: 0.3 MG/DL (ref 0–1.2)
BUN SERPL-MCNC: 10 MG/DL (ref 6–20)
BUN/CREAT SERPL: 11.6 (ref 7–25)
CALCIUM SERPL-MCNC: 9.9 MG/DL (ref 8.6–10.5)
CHLORIDE SERPL-SCNC: 100 MMOL/L (ref 98–107)
CHOLEST SERPL-MCNC: 267 MG/DL (ref 0–200)
CO2 SERPL-SCNC: 26.6 MMOL/L (ref 22–29)
CREAT SERPL-MCNC: 0.86 MG/DL (ref 0.57–1)
EGFRCR SERPLBLD CKD-EPI 2021: 81.9 ML/MIN/1.73
ERYTHROCYTE [DISTWIDTH] IN BLOOD BY AUTOMATED COUNT: 14.4 % (ref 12.3–15.4)
GLOBULIN SER CALC-MCNC: 2.8 GM/DL
GLUCOSE SERPL-MCNC: 333 MG/DL (ref 65–99)
HBA1C MFR BLD: 13.1 % (ref 4.8–5.6)
HCT VFR BLD AUTO: 41.4 % (ref 34–46.6)
HDLC SERPL-MCNC: 51 MG/DL (ref 40–60)
HGB BLD-MCNC: 13.2 G/DL (ref 12–15.9)
LDLC SERPL CALC-MCNC: 200 MG/DL (ref 0–100)
MCH RBC QN AUTO: 26.1 PG (ref 26.6–33)
MCHC RBC AUTO-ENTMCNC: 31.9 G/DL (ref 31.5–35.7)
MCV RBC AUTO: 81.8 FL (ref 79–97)
PLATELET # BLD AUTO: 378 10*3/MM3 (ref 140–450)
POTASSIUM SERPL-SCNC: 4.8 MMOL/L (ref 3.5–5.2)
PROT SERPL-MCNC: 7.4 G/DL (ref 6–8.5)
RBC # BLD AUTO: 5.06 10*6/MM3 (ref 3.77–5.28)
SODIUM SERPL-SCNC: 138 MMOL/L (ref 136–145)
TRIGL SERPL-MCNC: 94 MG/DL (ref 0–150)
VLDLC SERPL CALC-MCNC: 16 MG/DL (ref 5–40)
WBC # BLD AUTO: 9.06 10*3/MM3 (ref 3.4–10.8)

## 2024-03-28 RX ORDER — METFORMIN HYDROCHLORIDE 500 MG/1
500 TABLET, EXTENDED RELEASE ORAL
COMMUNITY
Start: 2024-02-23

## 2024-03-28 RX ORDER — GLIPIZIDE 10 MG/1
10 TABLET ORAL
COMMUNITY

## 2024-03-28 NOTE — PROGRESS NOTES
[facility]   Obstetrics and Gynecology   Routine Annual Visit    3/28/2024    Patient: Angelo Lay          MR#:[id number]    History of Present Illness    Chief Complaint   Patient presents with    Annual Exam     AE today, Last AE 2021, Last pap 2020 nml and HPV neg, MG 2023, Has not had a colonoscopy, OCP's, No problems today       53 y.o. female  who presents for annual exam.  H/o endometrial ablation without tubal.  Reports amenorrhea after ablation but started bleeding again after 10 years.  Taking POPs.  Menses occur every 2-3 months but not consistently.  Has had some hot flashes.    -H/o recurrent yeast infections.  This has improved greatly with diabetic control.  She also completed a 6-month suppressive Diflucan regimen.    Studies reviewed:  Mammo Screening Digital Tomosynthesis Bilateral With CAD (2023 11:21) normal, repeat 1 yr  PapIG, HPV, Rfx 16 / 18 (2020 15:53) NIL, HPV neg  Never had colonscopy    Obstetric History:  OB History          2    Para   1    Term   1            AB   1    Living   1         SAB        IAB        Ectopic        Molar        Multiple        Live Births   1               Menstrual History:     No LMP recorded (lmp unknown). (Menstrual status: Oral contraceptives).       Sexual History:   Not sexually actively currently, declines STD testing      Social History:   [employer] attendance clerk    ________________________________________  Patient Active Problem List   Diagnosis    Type 2 diabetes mellitus     Past Medical History:   Diagnosis Date    Abnormal Pap smear of cervix     tx w/ cryo in 20s, f/u wnl    Diabetes mellitus     Hyperlipidemia      Past Surgical History:   Procedure Laterality Date    ENDOMETRIAL ABLATION       Social History     Tobacco Use   Smoking Status Never    Passive exposure: Never   Smokeless Tobacco Never     Family History   Problem Relation Age of Onset    Diabetes Father      Heart disease Father     No Known Problems Mother     Cancer Paternal Grandmother     Heart disease Maternal Grandmother     Heart disease Maternal Grandfather     Breast cancer Maternal Aunt     Ovarian cancer Neg Hx     Uterine cancer Neg Hx     Colon cancer Neg Hx     Deep vein thrombosis Neg Hx     Pulmonary embolism Neg Hx      Prior to Admission medications    Medication Sig Start Date End Date Taking? Authorizing Provider   Accu-Chek Guide test strip USE TO TEST BLOOD GLUCOSE THREE TIMES DAILY 12/8/21  Yes Betzy Stephens MD   atorvastatin (LIPITOR) 40 MG tablet TK 1 T PO HS 10/26/17  Yes Betzy Stephens MD   Farxiga 5 MG tablet tablet  6/13/22  Yes Betzy Stephens MD   fluconazole (Diflucan) 150 MG tablet Take one tab every 72 hours for 3 doses then once weekly for 6 months. 12/7/21  Yes Lindy Colmenares MD   glipizide (GLUCOTROL) 10 MG tablet Take 1 tablet by mouth 2 (Two) Times a Day Before Meals.   Yes Betzy Stephens MD   metFORMIN ER (GLUCOPHAGE-XR) 500 MG 24 hr tablet Take 1 tablet by mouth 2 (Two) Times a Day Before Meals. 2/23/24  Yes Betzy Stephens MD   norethindrone (Norlyda) 0.35 MG tablet Take 1 tablet by mouth Daily. 1/2/24  Yes Lindy Colmenares MD   nystatin (MYCOSTATIN) 167768 UNIT/GM ointment Apply 1 Application topically to the appropriate area as directed 2 (Two) Times a Day. 1/2/24  Yes Lindy Colmenares MD   fluconazole (DIFLUCAN) 150 MG tablet Take one tablet by mouth.  Repeat in 3 days if symptoms not resolved. 1/2/24 3/28/24  Lindy Colmenares MD   metFORMIN (GLUCOPHAGE) 1000 MG tablet Take 1,000 mg by mouth 2 (Two) Times a Day With Meals. 7/19/21 3/28/24  Betzy Stephens MD     ________________________________________    Current contraception: oral progesterone-only contraceptive  History of abnormal Pap smear: no  Family history of uterine or ovarian cancer: no  Family History of colon cancer/colon polyps: no  History of abnormal  "mammogram: yes - see above    The following portions of the patient's history were reviewed and updated as appropriate: allergies, current medications, past family history, past medical history, past social history, past surgical history, and problem list.    Review of Systems   All other systems reviewed and are negative.           Objective     /72   Ht 167.6 cm (66\")   Wt 117 kg (259 lb)   LMP  (LMP Unknown)   Breastfeeding No   BMI 41.80 kg/m²    BP Readings from Last 3 Encounters:   03/28/24 122/72   06/22/22 122/74   12/22/21 118/64      Wt Readings from Last 3 Encounters:   03/28/24 117 kg (259 lb)   06/22/22 126 kg (277 lb 6.4 oz)   12/22/21 119 kg (262 lb 6.4 oz)        BMI: Estimated body mass index is 41.8 kg/m² as calculated from the following:    Height as of this encounter: 167.6 cm (66\").    Weight as of this encounter: 117 kg (259 lb).    Physical Exam  Vitals and nursing note reviewed.   Constitutional:       General: She is not in acute distress.     Appearance: Normal appearance.   HENT:      Head: Normocephalic and atraumatic.   Eyes:      Extraocular Movements: Extraocular movements intact.   Cardiovascular:      Rate and Rhythm: Normal rate and regular rhythm.      Pulses: Normal pulses.      Heart sounds: No murmur heard.  Pulmonary:      Effort: Pulmonary effort is normal. No respiratory distress.      Breath sounds: Normal breath sounds.   Chest:   Breasts:     Right: Normal. No mass, nipple discharge, skin change or tenderness.      Left: Normal. No mass, nipple discharge, skin change or tenderness.   Abdominal:      General: There is no distension.      Palpations: Abdomen is soft. There is no mass.      Tenderness: There is no abdominal tenderness.   Genitourinary:     General: Normal vulva.      Labia:         Right: No rash or lesion.         Left: No rash or lesion.       Urethra: No prolapse, urethral swelling or urethral lesion.      Vagina: Normal.      Cervix: Normal.    "   Uterus: Normal.       Adnexa: Right adnexa normal and left adnexa normal.      Rectum: Normal.      Comments: Bladder: no masses or tenderness  Perineum/Anus: no masses, lesions, or skin changes  Musculoskeletal:         General: No swelling. Normal range of motion.      Cervical back: Normal range of motion.   Lymphadenopathy:      Upper Body:      Right upper body: No axillary adenopathy.      Left upper body: No axillary adenopathy.   Skin:     General: Skin is warm and dry.   Neurological:      General: No focal deficit present.      Mental Status: She is alert and oriented to person, place, and time.   Psychiatric:         Mood and Affect: Mood normal.         Behavior: Behavior normal.         As part of wellness and prevention, the following topics were discussed with the patient:  Encouraged self breast exam  Physical activity and regular exercised encouraged.   Injury prevention discussed.  Healthy weight discussed.  Nutrition discussed.  Substance abuse/misuse discussed.  Sexual behavior/safe practices discussed.   Sexual transmitted disease prevention   Contraception discussed.   Mental health discussed.           Assessment:  Diagnoses and all orders for this visit:    1. Well woman exam with routine gynecological exam (Primary)  -     Hemoglobin A1c  -     Lipid Panel  -     Comprehensive Metabolic Panel  -     CBC (No Diff)  -     Ambulatory Referral For Screening Colonoscopy  -     IGP, Apt HPV,rfx 16 / 18,45    2. Oral contraceptive use    3. Type 2 diabetes mellitus without complication, without long-term current use of insulin  -     Hemoglobin A1c  -     Lipid Panel  -     Comprehensive Metabolic Panel  -     CBC (No Diff)    4. Screen for colon cancer  -     Ambulatory Referral For Screening Colonoscopy    5. Screening for cervical cancer  -     IGP, Apt HPV,rfx 16 / 18,45      -Breast and pelvic exam normal  - Pap today  - Declined STD screen  - Mammogram up-to-date  - Colonoscopy ordered  -  Discussed stopping p.o. P this year to see if she menstruates.  There was a 10-year time period after ablation during which she was amenorrheic.  She then started menstruating again.  She has been taking p.o. P because she did not get sterilized at the time of ablation.  Discussed stopping p.o. P and monitoring menses.  If she is sexually active, I recommend condom use.  If she has significant bleeding, I asked that she make an appointment for pelvic ultrasound.    Plan:  Return in about 1 year (around 3/28/2025) for Annual.      Lindy Colmenares MD  3/28/2024 09:39 EDT

## 2024-03-29 ENCOUNTER — TELEPHONE (OUTPATIENT)
Dept: OBSTETRICS AND GYNECOLOGY | Age: 54
End: 2024-03-29
Payer: COMMERCIAL

## 2024-03-29 DIAGNOSIS — E11.9 TYPE 2 DIABETES MELLITUS WITHOUT COMPLICATION, WITHOUT LONG-TERM CURRENT USE OF INSULIN: Primary | ICD-10-CM

## 2024-03-29 DIAGNOSIS — E78.00 HYPERCHOLESTEREMIA: ICD-10-CM

## 2024-03-29 NOTE — TELEPHONE ENCOUNTER
----- Message from Lindy Colmenares MD sent at 3/29/2024 11:22 AM EDT -----  Please notify patient that her A1c has elevated back to 13.1.  Her cholesterol panel is also significantly elevated.  I recommend that she follow-up with her primary care physician ASAP for further management of both of these issues.  If left unchecked, both diabetes and hypercholesterolemia can lead to heart attack, stroke, kidney disease, infections, and neuropathies.  All of these risk can be mitigated by appropriate treatment now.  I have placed a referral to internal medicine to make sure that this appointment gets scheduled.  I tried to send this note to Dr. Mejía her listed PCP as well but was unable to find her in the system.

## 2024-03-29 NOTE — PROGRESS NOTES
Please notify patient that her A1c has elevated back to 13.1.  Her cholesterol panel is also significantly elevated.  I recommend that she follow-up with her primary care physician ASAP for further management of both of these issues.  If left unchecked, both diabetes and hypercholesterolemia can lead to heart attack, stroke, kidney disease, infections, and neuropathies.  All of these risk can be mitigated by appropriate treatment now.  I have placed a referral to internal medicine to make sure that this appointment gets scheduled.  I tried to send this note to Dr. Mejía her listed PCP as well but was unable to find her in the system.

## 2024-03-29 NOTE — TELEPHONE ENCOUNTER
[facility] for results:  A1c has elevated back to 13.1.  Her cholesterol panel is also significantly elevated.  I recommend that she follow-up with her primary care physician ASAP for further management of both of these issues.  If left unchecked, both diabetes and hypercholesterolemia can lead to heart attack, stroke, kidney disease, infections, and neuropathies.  All of these risk can be mitigated by appropriate treatment now.  I have placed a referral to internal medicine to make sure that this appointment gets scheduled.  I tried to send this note to Dr. Mejía her listed PCP as well but was unable to find her in the system.

## 2024-04-01 LAB
CYTOLOGIST CVX/VAG CYTO: NORMAL
CYTOLOGY CVX/VAG DOC CYTO: NORMAL
CYTOLOGY CVX/VAG DOC THIN PREP: NORMAL
DX ICD CODE: NORMAL
HPV I/H RISK 4 DNA CVX QL PROBE+SIG AMP: NEGATIVE
Lab: NORMAL
OTHER STN SPEC: NORMAL
STAT OF ADQ CVX/VAG CYTO-IMP: NORMAL

## 2024-05-16 ENCOUNTER — TELEPHONE (OUTPATIENT)
Dept: OBSTETRICS AND GYNECOLOGY | Age: 54
End: 2024-05-16
Payer: COMMERCIAL

## 2024-05-17 RX ORDER — FLUCONAZOLE 150 MG/1
TABLET ORAL
Qty: 2 TABLET | Refills: 0 | Status: SHIPPED | OUTPATIENT
Start: 2024-05-17

## 2024-08-20 ENCOUNTER — HOSPITAL ENCOUNTER (OUTPATIENT)
Facility: HOSPITAL | Age: 54
Discharge: HOME | End: 2024-08-20
Admitting: STUDENT IN AN ORGANIZED HEALTH CARE EDUCATION/TRAINING PROGRAM
Payer: COMMERCIAL

## 2024-08-20 DIAGNOSIS — Z12.31 VISIT FOR SCREENING MAMMOGRAM: ICD-10-CM

## 2024-08-20 PROCEDURE — 77067 SCR MAMMO BI INCL CAD: CPT

## 2024-08-20 PROCEDURE — 77063 BREAST TOMOSYNTHESIS BI: CPT

## 2024-08-22 DIAGNOSIS — R92.8 ABNORMAL MAMMOGRAM: Primary | ICD-10-CM

## 2024-08-22 DIAGNOSIS — R92.1 CALCIFICATION OF LEFT BREAST ON MAMMOGRAPHY: ICD-10-CM

## 2024-08-26 ENCOUNTER — TELEPHONE (OUTPATIENT)
Dept: OBSTETRICS AND GYNECOLOGY | Age: 54
End: 2024-08-26
Payer: COMMERCIAL

## 2024-08-26 ENCOUNTER — APPOINTMENT (OUTPATIENT)
Dept: WOMENS IMAGING | Facility: HOSPITAL | Age: 54
End: 2024-08-26
Payer: COMMERCIAL

## 2024-08-26 DIAGNOSIS — R92.1 CALCIFICATION OF LEFT BREAST ON MAMMOGRAPHY: Primary | ICD-10-CM

## 2024-08-26 PROCEDURE — G0279 TOMOSYNTHESIS, MAMMO: HCPCS | Performed by: RADIOLOGY

## 2024-08-26 PROCEDURE — 77065 DX MAMMO INCL CAD UNI: CPT | Performed by: RADIOLOGY

## 2024-08-26 PROCEDURE — 77061 BREAST TOMOSYNTHESIS UNI: CPT | Performed by: RADIOLOGY

## 2024-08-26 NOTE — TELEPHONE ENCOUNTER
8/26/2024  pt and imaging results given and the need for a biopsy. Order placed for L breast stereotatic biopsy. Appt made for pt on 9/23/24 8am.

## 2024-09-23 ENCOUNTER — LAB REQUISITION (OUTPATIENT)
Dept: LAB | Facility: HOSPITAL | Age: 54
End: 2024-09-23
Payer: COMMERCIAL

## 2024-09-23 ENCOUNTER — APPOINTMENT (OUTPATIENT)
Dept: WOMENS IMAGING | Facility: HOSPITAL | Age: 54
End: 2024-09-23
Payer: COMMERCIAL

## 2024-09-23 ENCOUNTER — HOSPITAL ENCOUNTER (OUTPATIENT)
Dept: MAMMOGRAPHY | Facility: HOSPITAL | Age: 54
Discharge: HOME | End: 2024-09-23
Payer: COMMERCIAL

## 2024-09-23 DIAGNOSIS — R92.1 MAMMOGRAPHIC CALCIFICATION FOUND ON DIAGNOSTIC IMAGING OF BREAST: ICD-10-CM

## 2024-09-23 DIAGNOSIS — R92.1 BREAST CALCIFICATION, LEFT: ICD-10-CM

## 2024-09-23 PROCEDURE — 76098 X-RAY EXAM SURGICAL SPECIMEN: CPT

## 2024-09-23 PROCEDURE — C1819 TISSUE LOCALIZATION-EXCISION: HCPCS | Performed by: RADIOLOGY

## 2024-09-23 PROCEDURE — 88305 TISSUE EXAM BY PATHOLOGIST: CPT | Performed by: STUDENT IN AN ORGANIZED HEALTH CARE EDUCATION/TRAINING PROGRAM

## 2024-09-23 PROCEDURE — A4648 IMPLANTABLE TISSUE MARKER: HCPCS | Performed by: RADIOLOGY

## 2024-09-23 PROCEDURE — 19081 BX BREAST 1ST LESION STRTCTC: CPT | Performed by: RADIOLOGY

## 2024-09-25 ENCOUNTER — TELEPHONE (OUTPATIENT)
Dept: OBSTETRICS AND GYNECOLOGY | Age: 54
End: 2024-09-25
Payer: COMMERCIAL

## 2024-09-25 DIAGNOSIS — Z12.31 SCREENING MAMMOGRAM FOR BREAST CANCER: Primary | ICD-10-CM

## 2024-09-25 LAB
CYTO UR: NORMAL
DX PRELIMINARY: NORMAL
LAB AP CASE REPORT: NORMAL
PATH REPORT.ADDENDUM SPEC: NORMAL
PATH REPORT.FINAL DX SPEC: NORMAL
PATH REPORT.GROSS SPEC: NORMAL

## 2024-09-25 RX ORDER — FLUCONAZOLE 150 MG/1
TABLET ORAL
Qty: 2 TABLET | Refills: 0 | Status: SHIPPED | OUTPATIENT
Start: 2024-09-25

## 2025-02-05 ENCOUNTER — TELEPHONE (OUTPATIENT)
Dept: OBSTETRICS AND GYNECOLOGY | Age: 55
End: 2025-02-05
Payer: COMMERCIAL

## 2025-02-05 RX ORDER — FLUCONAZOLE 150 MG/1
TABLET ORAL
Qty: 3 TABLET | Refills: 0 | Status: SHIPPED | OUTPATIENT
Start: 2025-02-05

## 2025-02-05 NOTE — TELEPHONE ENCOUNTER
Please let patient know I have sent in 3 doses of Diflucan for her.  She can take it as often as every 3 days.

## 2025-03-31 NOTE — TELEPHONE ENCOUNTER
Caller: Angelo Lay    Relationship: Self    Best call back number: [phone]      Requested Prescriptions:   Requested Prescriptions     Pending Prescriptions Disp Refills    fluconazole (Diflucan) 150 MG tablet 3 tablet 0     Sig: Take 1 tablet by mouth up to every 3 days as needed for yeast infection.        Pharmacy where request should be sent: [id number] [address] [facility] [phone] [facility] [phone] FX     Last office visit with prescribing clinician: 3/28/2024   Last telemedicine visit with prescribing clinician: Visit date not found   Next office visit with prescribing clinician: 1/28/2026       Does the patient have less than a 3 day supply:  [x] Yes  [] No

## 2025-04-02 RX ORDER — FLUCONAZOLE 150 MG/1
TABLET ORAL
Qty: 3 TABLET | Refills: 0 | OUTPATIENT
Start: 2025-04-02

## 2025-05-13 RX ORDER — FLUCONAZOLE 150 MG/1
TABLET ORAL
Qty: 3 TABLET | Refills: 0 | Status: SHIPPED | OUTPATIENT
Start: 2025-05-13

## 2025-08-21 ENCOUNTER — HOSPITAL ENCOUNTER (OUTPATIENT)
Facility: HOSPITAL | Age: 55
Discharge: HOME | End: 2025-08-21
Admitting: STUDENT IN AN ORGANIZED HEALTH CARE EDUCATION/TRAINING PROGRAM
Payer: COMMERCIAL

## 2025-08-21 DIAGNOSIS — Z12.31 SCREENING MAMMOGRAM FOR BREAST CANCER: ICD-10-CM

## 2025-08-21 PROCEDURE — 77063 BREAST TOMOSYNTHESIS BI: CPT

## 2025-08-21 PROCEDURE — 77067 SCR MAMMO BI INCL CAD: CPT
